# Patient Record
Sex: MALE | Race: WHITE | NOT HISPANIC OR LATINO | Employment: OTHER | URBAN - METROPOLITAN AREA
[De-identification: names, ages, dates, MRNs, and addresses within clinical notes are randomized per-mention and may not be internally consistent; named-entity substitution may affect disease eponyms.]

---

## 2018-12-31 ENCOUNTER — APPOINTMENT (OUTPATIENT)
Dept: PREADMISSION TESTING | Facility: HOSPITAL | Age: 68
End: 2018-12-31

## 2018-12-31 VITALS
RESPIRATION RATE: 20 BRPM | WEIGHT: 217 LBS | OXYGEN SATURATION: 96 % | HEIGHT: 73 IN | BODY MASS INDEX: 28.76 KG/M2 | TEMPERATURE: 98.3 F | SYSTOLIC BLOOD PRESSURE: 126 MMHG | DIASTOLIC BLOOD PRESSURE: 80 MMHG | HEART RATE: 56 BPM

## 2018-12-31 RX ORDER — ALLOPURINOL 300 MG/1
300 TABLET ORAL EVERY MORNING
COMMUNITY

## 2018-12-31 RX ORDER — CHLORHEXIDINE GLUCONATE 500 MG/1
1 CLOTH TOPICAL TAKE AS DIRECTED
COMMUNITY
End: 2019-01-03 | Stop reason: HOSPADM

## 2018-12-31 RX ORDER — AMLODIPINE BESYLATE 5 MG/1
5 TABLET ORAL EVERY MORNING
COMMUNITY

## 2018-12-31 RX ORDER — METOPROLOL SUCCINATE 100 MG/1
50 TABLET, EXTENDED RELEASE ORAL NIGHTLY
COMMUNITY

## 2018-12-31 RX ORDER — MELOXICAM 15 MG/1
15 TABLET ORAL DAILY
Status: ON HOLD | COMMUNITY
End: 2019-01-07

## 2018-12-31 RX ORDER — LISINOPRIL 20 MG/1
20 TABLET ORAL EVERY MORNING
COMMUNITY

## 2018-12-31 RX ORDER — ACETAMINOPHEN 500 MG
1000 TABLET ORAL EVERY 6 HOURS PRN
COMMUNITY

## 2019-01-02 ENCOUNTER — HOSPITAL ENCOUNTER (INPATIENT)
Facility: HOSPITAL | Age: 69
LOS: 1 days | Discharge: HOME | End: 2019-01-03
Attending: ORTHOPAEDIC SURGERY | Admitting: ORTHOPAEDIC SURGERY

## 2019-01-02 ENCOUNTER — ANESTHESIA (OUTPATIENT)
Dept: PERIOP | Facility: HOSPITAL | Age: 69
End: 2019-01-02

## 2019-01-02 ENCOUNTER — ANESTHESIA EVENT (OUTPATIENT)
Dept: PERIOP | Facility: HOSPITAL | Age: 69
End: 2019-01-02

## 2019-01-02 ENCOUNTER — APPOINTMENT (OUTPATIENT)
Dept: GENERAL RADIOLOGY | Facility: HOSPITAL | Age: 69
End: 2019-01-02

## 2019-01-02 PROBLEM — M48.02 CERVICAL SPINAL STENOSIS: Status: ACTIVE | Noted: 2019-01-02

## 2019-01-02 LAB
ABO GROUP BLD: NORMAL
ANION GAP SERPL CALCULATED.3IONS-SCNC: 15.4 MMOL/L
BASOPHILS # BLD AUTO: 0.01 10*3/MM3 (ref 0–0.2)
BASOPHILS NFR BLD AUTO: 0.1 % (ref 0–1.5)
BLD GP AB SCN SERPL QL: NEGATIVE
BUN BLD-MCNC: 16 MG/DL (ref 8–23)
BUN/CREAT SERPL: 12.5 (ref 7–25)
CALCIUM SPEC-SCNC: 9.1 MG/DL (ref 8.6–10.5)
CHLORIDE SERPL-SCNC: 100 MMOL/L (ref 98–107)
CO2 SERPL-SCNC: 23.6 MMOL/L (ref 22–29)
CREAT BLD-MCNC: 1.28 MG/DL (ref 0.76–1.27)
DEPRECATED RDW RBC AUTO: 43.7 FL (ref 37–54)
EOSINOPHIL # BLD AUTO: 0 10*3/MM3 (ref 0–0.7)
EOSINOPHIL NFR BLD AUTO: 0 % (ref 0.3–6.2)
ERYTHROCYTE [DISTWIDTH] IN BLOOD BY AUTOMATED COUNT: 13.2 % (ref 11.5–14.5)
GFR SERPL CREATININE-BSD FRML MDRD: 56 ML/MIN/1.73
GLUCOSE BLD-MCNC: 195 MG/DL (ref 65–99)
HCT VFR BLD AUTO: 45.4 % (ref 40.4–52.2)
HGB BLD-MCNC: 15.4 G/DL (ref 13.7–17.6)
IMM GRANULOCYTES # BLD AUTO: 0.03 10*3/MM3 (ref 0–0.03)
IMM GRANULOCYTES NFR BLD AUTO: 0.2 % (ref 0–0.5)
LYMPHOCYTES # BLD AUTO: 0.78 10*3/MM3 (ref 0.9–4.8)
LYMPHOCYTES NFR BLD AUTO: 5.6 % (ref 19.6–45.3)
MCH RBC QN AUTO: 30.9 PG (ref 27–32.7)
MCHC RBC AUTO-ENTMCNC: 33.9 G/DL (ref 32.6–36.4)
MCV RBC AUTO: 91.2 FL (ref 79.8–96.2)
MONOCYTES # BLD AUTO: 0.13 10*3/MM3 (ref 0.2–1.2)
MONOCYTES NFR BLD AUTO: 0.9 % (ref 5–12)
NEUTROPHILS # BLD AUTO: 13.03 10*3/MM3 (ref 1.9–8.1)
NEUTROPHILS NFR BLD AUTO: 93.4 % (ref 42.7–76)
PLATELET # BLD AUTO: 190 10*3/MM3 (ref 140–500)
PMV BLD AUTO: 10.9 FL (ref 6–12)
POTASSIUM BLD-SCNC: 4.2 MMOL/L (ref 3.5–5.2)
RBC # BLD AUTO: 4.98 10*6/MM3 (ref 4.6–6)
RH BLD: POSITIVE
SODIUM BLD-SCNC: 139 MMOL/L (ref 136–145)
T&S EXPIRATION DATE: NORMAL
WBC NRBC COR # BLD: 13.95 10*3/MM3 (ref 4.5–10.7)

## 2019-01-02 PROCEDURE — C1713 ANCHOR/SCREW BN/BN,TIS/BN: HCPCS | Performed by: ORTHOPAEDIC SURGERY

## 2019-01-02 PROCEDURE — 25010000002 ONDANSETRON PER 1 MG: Performed by: NURSE ANESTHETIST, CERTIFIED REGISTERED

## 2019-01-02 PROCEDURE — 85025 COMPLETE CBC W/AUTO DIFF WBC: CPT | Performed by: HOSPITALIST

## 2019-01-02 PROCEDURE — 76000 FLUOROSCOPY <1 HR PHYS/QHP: CPT

## 2019-01-02 PROCEDURE — L0120 CERV FLEX N/ADJ FOAM PRE OTS: HCPCS | Performed by: ORTHOPAEDIC SURGERY

## 2019-01-02 PROCEDURE — 25010000003 CEFAZOLIN IN DEXTROSE 2-4 GM/100ML-% SOLUTION: Performed by: ORTHOPAEDIC SURGERY

## 2019-01-02 PROCEDURE — 0RB30ZZ EXCISION OF CERVICAL VERTEBRAL DISC, OPEN APPROACH: ICD-10-PCS | Performed by: ORTHOPAEDIC SURGERY

## 2019-01-02 PROCEDURE — 25010000002 FENTANYL CITRATE (PF) 100 MCG/2ML SOLUTION: Performed by: ANESTHESIOLOGY

## 2019-01-02 PROCEDURE — 25010000002 DEXAMETHASONE PER 1 MG: Performed by: NURSE ANESTHETIST, CERTIFIED REGISTERED

## 2019-01-02 PROCEDURE — 25010000002 MIDAZOLAM PER 1 MG: Performed by: ANESTHESIOLOGY

## 2019-01-02 PROCEDURE — 86901 BLOOD TYPING SEROLOGIC RH(D): CPT | Performed by: ORTHOPAEDIC SURGERY

## 2019-01-02 PROCEDURE — 72040 X-RAY EXAM NECK SPINE 2-3 VW: CPT

## 2019-01-02 PROCEDURE — 0RB50ZZ EXCISION OF CERVICOTHORACIC VERTEBRAL DISC, OPEN APPROACH: ICD-10-PCS | Performed by: ORTHOPAEDIC SURGERY

## 2019-01-02 PROCEDURE — 86850 RBC ANTIBODY SCREEN: CPT | Performed by: ORTHOPAEDIC SURGERY

## 2019-01-02 PROCEDURE — 25010000002 FENTANYL CITRATE (PF) 100 MCG/2ML SOLUTION: Performed by: NURSE ANESTHETIST, CERTIFIED REGISTERED

## 2019-01-02 PROCEDURE — 25010000002 HYDROMORPHONE PER 4 MG: Performed by: NURSE ANESTHETIST, CERTIFIED REGISTERED

## 2019-01-02 PROCEDURE — 25010000003 DEXAMETHASONE SODIUM PHOSPHATE 100 MG/10ML SOLUTION 10 ML VIAL: Performed by: ORTHOPAEDIC SURGERY

## 2019-01-02 PROCEDURE — 86900 BLOOD TYPING SEROLOGIC ABO: CPT | Performed by: ORTHOPAEDIC SURGERY

## 2019-01-02 PROCEDURE — 0RG20A0 FUSION OF 2 OR MORE CERVICAL VERTEBRAL JOINTS WITH INTERBODY FUSION DEVICE, ANTERIOR APPROACH, ANTERIOR COLUMN, OPEN APPROACH: ICD-10-PCS | Performed by: ORTHOPAEDIC SURGERY

## 2019-01-02 PROCEDURE — 93010 ELECTROCARDIOGRAM REPORT: CPT | Performed by: INTERNAL MEDICINE

## 2019-01-02 PROCEDURE — 93005 ELECTROCARDIOGRAM TRACING: CPT | Performed by: HOSPITALIST

## 2019-01-02 PROCEDURE — 01N10ZZ RELEASE CERVICAL NERVE, OPEN APPROACH: ICD-10-PCS | Performed by: ORTHOPAEDIC SURGERY

## 2019-01-02 PROCEDURE — 80048 BASIC METABOLIC PNL TOTAL CA: CPT | Performed by: HOSPITALIST

## 2019-01-02 PROCEDURE — 25010000002 PROPOFOL 10 MG/ML EMULSION: Performed by: NURSE ANESTHETIST, CERTIFIED REGISTERED

## 2019-01-02 DEVICE — ANTERIOR CERVICAL PLATE ASSEMBLY, 4-LEVEL, 072MM
Type: IMPLANTABLE DEVICE | Site: SPINE CERVICAL | Status: FUNCTIONAL
Brand: TRESTLE LUXE

## 2019-01-02 DEVICE — KT HEMOST ABS SURGIFOAM PWDR 1GRAM: Type: IMPLANTABLE DEVICE | Site: SPINE CERVICAL | Status: FUNCTIONAL

## 2019-01-02 DEVICE — 4.0MM VARIABLE ANGLE, SELF-DRILLING HEXALOBE SCREW, 14MM
Type: IMPLANTABLE DEVICE | Site: SPINE CERVICAL | Status: FUNCTIONAL
Brand: TRESTLE LUXE

## 2019-01-02 DEVICE — WAX BONE HEMO NAT 2.5G: Type: IMPLANTABLE DEVICE | Site: SPINE CERVICAL | Status: FUNCTIONAL

## 2019-01-02 DEVICE — PUTTY DBM PROGENIX PLS 2.5CC: Type: IMPLANTABLE DEVICE | Site: SPINE CERVICAL | Status: FUNCTIONAL

## 2019-01-02 DEVICE — NOVEL CERVICAL SPACER, 7 MM LARGE, 7DEG LORDOTIC, PEEK
Type: IMPLANTABLE DEVICE | Site: SPINE CERVICAL | Status: FUNCTIONAL
Brand: NOVEL SPINAL SPACER SYSTEM

## 2019-01-02 RX ORDER — PROMETHAZINE HYDROCHLORIDE 25 MG/1
25 SUPPOSITORY RECTAL ONCE AS NEEDED
Status: DISCONTINUED | OUTPATIENT
Start: 2019-01-02 | End: 2019-01-02 | Stop reason: HOSPADM

## 2019-01-02 RX ORDER — DIPHENHYDRAMINE HCL 25 MG
25 CAPSULE ORAL
Status: DISCONTINUED | OUTPATIENT
Start: 2019-01-02 | End: 2019-01-02 | Stop reason: HOSPADM

## 2019-01-02 RX ORDER — PROMETHAZINE HYDROCHLORIDE 25 MG/ML
12.5 INJECTION, SOLUTION INTRAMUSCULAR; INTRAVENOUS ONCE AS NEEDED
Status: DISCONTINUED | OUTPATIENT
Start: 2019-01-02 | End: 2019-01-02 | Stop reason: HOSPADM

## 2019-01-02 RX ORDER — GABAPENTIN 300 MG/1
600 CAPSULE ORAL ONCE
Status: COMPLETED | OUTPATIENT
Start: 2019-01-02 | End: 2019-01-02

## 2019-01-02 RX ORDER — MIDAZOLAM HYDROCHLORIDE 1 MG/ML
2 INJECTION INTRAMUSCULAR; INTRAVENOUS
Status: DISCONTINUED | OUTPATIENT
Start: 2019-01-02 | End: 2019-01-02 | Stop reason: HOSPADM

## 2019-01-02 RX ORDER — ONDANSETRON 4 MG/1
4 TABLET, FILM COATED ORAL EVERY 6 HOURS PRN
Status: DISCONTINUED | OUTPATIENT
Start: 2019-01-02 | End: 2019-01-03 | Stop reason: HOSPADM

## 2019-01-02 RX ORDER — SODIUM CHLORIDE 0.9 % (FLUSH) 0.9 %
3-10 SYRINGE (ML) INJECTION AS NEEDED
Status: DISCONTINUED | OUTPATIENT
Start: 2019-01-02 | End: 2019-01-02 | Stop reason: HOSPADM

## 2019-01-02 RX ORDER — SENNA AND DOCUSATE SODIUM 50; 8.6 MG/1; MG/1
1 TABLET, FILM COATED ORAL NIGHTLY PRN
Status: DISCONTINUED | OUTPATIENT
Start: 2019-01-02 | End: 2019-01-03 | Stop reason: HOSPADM

## 2019-01-02 RX ORDER — ROCURONIUM BROMIDE 10 MG/ML
INJECTION, SOLUTION INTRAVENOUS AS NEEDED
Status: DISCONTINUED | OUTPATIENT
Start: 2019-01-02 | End: 2019-01-02 | Stop reason: SURG

## 2019-01-02 RX ORDER — HYDROMORPHONE HCL 110MG/55ML
PATIENT CONTROLLED ANALGESIA SYRINGE INTRAVENOUS AS NEEDED
Status: DISCONTINUED | OUTPATIENT
Start: 2019-01-02 | End: 2019-01-02 | Stop reason: SURG

## 2019-01-02 RX ORDER — EPHEDRINE SULFATE 50 MG/ML
INJECTION, SOLUTION INTRAVENOUS AS NEEDED
Status: DISCONTINUED | OUTPATIENT
Start: 2019-01-02 | End: 2019-01-02 | Stop reason: SURG

## 2019-01-02 RX ORDER — PROMETHAZINE HYDROCHLORIDE 25 MG/ML
12.5 INJECTION, SOLUTION INTRAMUSCULAR; INTRAVENOUS EVERY 6 HOURS PRN
Status: DISCONTINUED | OUTPATIENT
Start: 2019-01-02 | End: 2019-01-03 | Stop reason: HOSPADM

## 2019-01-02 RX ORDER — EPHEDRINE SULFATE 50 MG/ML
5 INJECTION, SOLUTION INTRAVENOUS ONCE AS NEEDED
Status: DISCONTINUED | OUTPATIENT
Start: 2019-01-02 | End: 2019-01-02 | Stop reason: HOSPADM

## 2019-01-02 RX ORDER — HYDROCODONE BITARTRATE AND ACETAMINOPHEN 5; 325 MG/1; MG/1
2 TABLET ORAL EVERY 4 HOURS PRN
Status: DISCONTINUED | OUTPATIENT
Start: 2019-01-02 | End: 2019-01-03 | Stop reason: HOSPADM

## 2019-01-02 RX ORDER — ACETAMINOPHEN 325 MG/1
650 TABLET ORAL ONCE AS NEEDED
Status: DISCONTINUED | OUTPATIENT
Start: 2019-01-02 | End: 2019-01-02 | Stop reason: HOSPADM

## 2019-01-02 RX ORDER — DEXAMETHASONE SODIUM PHOSPHATE 10 MG/ML
INJECTION INTRAMUSCULAR; INTRAVENOUS AS NEEDED
Status: DISCONTINUED | OUTPATIENT
Start: 2019-01-02 | End: 2019-01-02 | Stop reason: SURG

## 2019-01-02 RX ORDER — CYCLOBENZAPRINE HCL 10 MG
10 TABLET ORAL 3 TIMES DAILY PRN
Status: DISCONTINUED | OUTPATIENT
Start: 2019-01-02 | End: 2019-01-03 | Stop reason: HOSPADM

## 2019-01-02 RX ORDER — LABETALOL HYDROCHLORIDE 5 MG/ML
5 INJECTION, SOLUTION INTRAVENOUS
Status: DISCONTINUED | OUTPATIENT
Start: 2019-01-02 | End: 2019-01-02 | Stop reason: HOSPADM

## 2019-01-02 RX ORDER — LISINOPRIL 20 MG/1
20 TABLET ORAL EVERY MORNING
Status: DISCONTINUED | OUTPATIENT
Start: 2019-01-03 | End: 2019-01-03 | Stop reason: HOSPADM

## 2019-01-02 RX ORDER — SODIUM CHLORIDE, SODIUM LACTATE, POTASSIUM CHLORIDE, CALCIUM CHLORIDE 600; 310; 30; 20 MG/100ML; MG/100ML; MG/100ML; MG/100ML
9 INJECTION, SOLUTION INTRAVENOUS CONTINUOUS
Status: DISCONTINUED | OUTPATIENT
Start: 2019-01-02 | End: 2019-01-02 | Stop reason: HOSPADM

## 2019-01-02 RX ORDER — FLUMAZENIL 0.1 MG/ML
0.2 INJECTION INTRAVENOUS AS NEEDED
Status: DISCONTINUED | OUTPATIENT
Start: 2019-01-02 | End: 2019-01-02 | Stop reason: HOSPADM

## 2019-01-02 RX ORDER — SODIUM CHLORIDE 0.9 % (FLUSH) 0.9 %
1-10 SYRINGE (ML) INJECTION AS NEEDED
Status: DISCONTINUED | OUTPATIENT
Start: 2019-01-02 | End: 2019-01-02 | Stop reason: HOSPADM

## 2019-01-02 RX ORDER — PROMETHAZINE HYDROCHLORIDE 12.5 MG/1
12.5 TABLET ORAL EVERY 6 HOURS PRN
Status: DISCONTINUED | OUTPATIENT
Start: 2019-01-02 | End: 2019-01-03 | Stop reason: HOSPADM

## 2019-01-02 RX ORDER — SODIUM CHLORIDE 450 MG/100ML
100 INJECTION, SOLUTION INTRAVENOUS CONTINUOUS
Status: DISCONTINUED | OUTPATIENT
Start: 2019-01-02 | End: 2019-01-03 | Stop reason: HOSPADM

## 2019-01-02 RX ORDER — DOCUSATE SODIUM 100 MG/1
100 CAPSULE, LIQUID FILLED ORAL 2 TIMES DAILY PRN
Status: DISCONTINUED | OUTPATIENT
Start: 2019-01-02 | End: 2019-01-03 | Stop reason: HOSPADM

## 2019-01-02 RX ORDER — FENTANYL CITRATE 50 UG/ML
50 INJECTION, SOLUTION INTRAMUSCULAR; INTRAVENOUS
Status: DISCONTINUED | OUTPATIENT
Start: 2019-01-02 | End: 2019-01-02 | Stop reason: HOSPADM

## 2019-01-02 RX ORDER — BISACODYL 5 MG/1
5 TABLET, DELAYED RELEASE ORAL DAILY PRN
Status: DISCONTINUED | OUTPATIENT
Start: 2019-01-02 | End: 2019-01-03 | Stop reason: HOSPADM

## 2019-01-02 RX ORDER — HYDROCODONE BITARTRATE AND ACETAMINOPHEN 7.5; 325 MG/1; MG/1
1 TABLET ORAL ONCE AS NEEDED
Status: DISCONTINUED | OUTPATIENT
Start: 2019-01-02 | End: 2019-01-02 | Stop reason: HOSPADM

## 2019-01-02 RX ORDER — ONDANSETRON 2 MG/ML
INJECTION INTRAMUSCULAR; INTRAVENOUS AS NEEDED
Status: DISCONTINUED | OUTPATIENT
Start: 2019-01-02 | End: 2019-01-02 | Stop reason: SURG

## 2019-01-02 RX ORDER — SODIUM CHLORIDE 0.9 % (FLUSH) 0.9 %
3 SYRINGE (ML) INJECTION EVERY 12 HOURS SCHEDULED
Status: DISCONTINUED | OUTPATIENT
Start: 2019-01-02 | End: 2019-01-03 | Stop reason: HOSPADM

## 2019-01-02 RX ORDER — LIDOCAINE HYDROCHLORIDE 10 MG/ML
0.5 INJECTION, SOLUTION EPIDURAL; INFILTRATION; INTRACAUDAL; PERINEURAL ONCE AS NEEDED
Status: DISCONTINUED | OUTPATIENT
Start: 2019-01-02 | End: 2019-01-02 | Stop reason: HOSPADM

## 2019-01-02 RX ORDER — FENTANYL CITRATE 50 UG/ML
INJECTION, SOLUTION INTRAMUSCULAR; INTRAVENOUS AS NEEDED
Status: DISCONTINUED | OUTPATIENT
Start: 2019-01-02 | End: 2019-01-02 | Stop reason: SURG

## 2019-01-02 RX ORDER — ONDANSETRON 2 MG/ML
4 INJECTION INTRAMUSCULAR; INTRAVENOUS ONCE AS NEEDED
Status: COMPLETED | OUTPATIENT
Start: 2019-01-02 | End: 2019-01-02

## 2019-01-02 RX ORDER — SODIUM CHLORIDE 0.9 % (FLUSH) 0.9 %
3-10 SYRINGE (ML) INJECTION AS NEEDED
Status: DISCONTINUED | OUTPATIENT
Start: 2019-01-02 | End: 2019-01-03 | Stop reason: HOSPADM

## 2019-01-02 RX ORDER — HYDROMORPHONE HYDROCHLORIDE 1 MG/ML
0.5 INJECTION, SOLUTION INTRAMUSCULAR; INTRAVENOUS; SUBCUTANEOUS
Status: DISCONTINUED | OUTPATIENT
Start: 2019-01-02 | End: 2019-01-02 | Stop reason: HOSPADM

## 2019-01-02 RX ORDER — BISACODYL 10 MG
10 SUPPOSITORY, RECTAL RECTAL DAILY PRN
Status: DISCONTINUED | OUTPATIENT
Start: 2019-01-02 | End: 2019-01-03 | Stop reason: HOSPADM

## 2019-01-02 RX ORDER — SODIUM CHLORIDE 0.9 % (FLUSH) 0.9 %
3 SYRINGE (ML) INJECTION EVERY 12 HOURS SCHEDULED
Status: DISCONTINUED | OUTPATIENT
Start: 2019-01-02 | End: 2019-01-02 | Stop reason: HOSPADM

## 2019-01-02 RX ORDER — NALOXONE HCL 0.4 MG/ML
0.1 VIAL (ML) INJECTION
Status: DISCONTINUED | OUTPATIENT
Start: 2019-01-02 | End: 2019-01-03 | Stop reason: HOSPADM

## 2019-01-02 RX ORDER — NALOXONE HCL 0.4 MG/ML
0.2 VIAL (ML) INJECTION AS NEEDED
Status: DISCONTINUED | OUTPATIENT
Start: 2019-01-02 | End: 2019-01-02 | Stop reason: HOSPADM

## 2019-01-02 RX ORDER — HYDRALAZINE HYDROCHLORIDE 20 MG/ML
5 INJECTION INTRAMUSCULAR; INTRAVENOUS
Status: DISCONTINUED | OUTPATIENT
Start: 2019-01-02 | End: 2019-01-02 | Stop reason: HOSPADM

## 2019-01-02 RX ORDER — PROMETHAZINE HYDROCHLORIDE 25 MG/1
25 TABLET ORAL ONCE AS NEEDED
Status: DISCONTINUED | OUTPATIENT
Start: 2019-01-02 | End: 2019-01-02 | Stop reason: HOSPADM

## 2019-01-02 RX ORDER — METOPROLOL SUCCINATE 50 MG/1
50 TABLET, EXTENDED RELEASE ORAL NIGHTLY
Status: DISCONTINUED | OUTPATIENT
Start: 2019-01-02 | End: 2019-01-03 | Stop reason: HOSPADM

## 2019-01-02 RX ORDER — FAMOTIDINE 10 MG/ML
INJECTION, SOLUTION INTRAVENOUS
Status: COMPLETED
Start: 2019-01-02 | End: 2019-01-02

## 2019-01-02 RX ORDER — CEFAZOLIN SODIUM 2 G/100ML
2 INJECTION, SOLUTION INTRAVENOUS EVERY 8 HOURS
Status: DISCONTINUED | OUTPATIENT
Start: 2019-01-02 | End: 2019-01-03 | Stop reason: HOSPADM

## 2019-01-02 RX ORDER — PROMETHAZINE HYDROCHLORIDE 25 MG/1
12.5 SUPPOSITORY RECTAL EVERY 6 HOURS PRN
Status: DISCONTINUED | OUTPATIENT
Start: 2019-01-02 | End: 2019-01-03 | Stop reason: HOSPADM

## 2019-01-02 RX ORDER — OXYCODONE HCL 10 MG/1
10 TABLET, FILM COATED, EXTENDED RELEASE ORAL ONCE
Status: COMPLETED | OUTPATIENT
Start: 2019-01-02 | End: 2019-01-02

## 2019-01-02 RX ORDER — AMLODIPINE BESYLATE 5 MG/1
5 TABLET ORAL EVERY MORNING
Status: DISCONTINUED | OUTPATIENT
Start: 2019-01-03 | End: 2019-01-03 | Stop reason: HOSPADM

## 2019-01-02 RX ORDER — FAMOTIDINE 10 MG/ML
20 INJECTION, SOLUTION INTRAVENOUS ONCE
Status: COMPLETED | OUTPATIENT
Start: 2019-01-02 | End: 2019-01-02

## 2019-01-02 RX ORDER — PROPOFOL 10 MG/ML
VIAL (ML) INTRAVENOUS AS NEEDED
Status: DISCONTINUED | OUTPATIENT
Start: 2019-01-02 | End: 2019-01-02 | Stop reason: SURG

## 2019-01-02 RX ORDER — HYDROMORPHONE HCL IN 0.9% NACL 10 MG/50ML
PATIENT CONTROLLED ANALGESIA SYRINGE INTRAVENOUS CONTINUOUS
Status: DISCONTINUED | OUTPATIENT
Start: 2019-01-02 | End: 2019-01-03 | Stop reason: HOSPADM

## 2019-01-02 RX ORDER — MIDAZOLAM HYDROCHLORIDE 1 MG/ML
1 INJECTION INTRAMUSCULAR; INTRAVENOUS
Status: DISCONTINUED | OUTPATIENT
Start: 2019-01-02 | End: 2019-01-02 | Stop reason: HOSPADM

## 2019-01-02 RX ORDER — CEFAZOLIN SODIUM 2 G/100ML
2 INJECTION, SOLUTION INTRAVENOUS ONCE
Status: COMPLETED | OUTPATIENT
Start: 2019-01-02 | End: 2019-01-02

## 2019-01-02 RX ORDER — ONDANSETRON 4 MG/1
4 TABLET, ORALLY DISINTEGRATING ORAL EVERY 6 HOURS PRN
Status: DISCONTINUED | OUTPATIENT
Start: 2019-01-02 | End: 2019-01-03 | Stop reason: HOSPADM

## 2019-01-02 RX ORDER — ONDANSETRON 2 MG/ML
4 INJECTION INTRAMUSCULAR; INTRAVENOUS EVERY 6 HOURS PRN
Status: DISCONTINUED | OUTPATIENT
Start: 2019-01-02 | End: 2019-01-03 | Stop reason: HOSPADM

## 2019-01-02 RX ORDER — OXYCODONE AND ACETAMINOPHEN 7.5; 325 MG/1; MG/1
1 TABLET ORAL ONCE AS NEEDED
Status: DISCONTINUED | OUTPATIENT
Start: 2019-01-02 | End: 2019-01-02 | Stop reason: HOSPADM

## 2019-01-02 RX ORDER — ALLOPURINOL 300 MG/1
300 TABLET ORAL EVERY MORNING
Status: DISCONTINUED | OUTPATIENT
Start: 2019-01-03 | End: 2019-01-03 | Stop reason: HOSPADM

## 2019-01-02 RX ADMIN — FAMOTIDINE 20 MG: 10 INJECTION, SOLUTION INTRAVENOUS at 11:33

## 2019-01-02 RX ADMIN — METOPROLOL SUCCINATE 50 MG: 50 TABLET, EXTENDED RELEASE ORAL at 21:20

## 2019-01-02 RX ADMIN — CYCLOBENZAPRINE 10 MG: 10 TABLET, FILM COATED ORAL at 18:16

## 2019-01-02 RX ADMIN — HYDROMORPHONE HYDROCHLORIDE 0.5 MG: 2 INJECTION INTRAMUSCULAR; INTRAVENOUS; SUBCUTANEOUS at 15:39

## 2019-01-02 RX ADMIN — SODIUM CHLORIDE, POTASSIUM CHLORIDE, SODIUM LACTATE AND CALCIUM CHLORIDE: 600; 310; 30; 20 INJECTION, SOLUTION INTRAVENOUS at 14:35

## 2019-01-02 RX ADMIN — CEFAZOLIN SODIUM 2 G: 2 INJECTION, SOLUTION INTRAVENOUS at 13:03

## 2019-01-02 RX ADMIN — EPHEDRINE SULFATE 5 MG: 50 INJECTION INTRAMUSCULAR; INTRAVENOUS; SUBCUTANEOUS at 13:47

## 2019-01-02 RX ADMIN — HYDROMORPHONE HYDROCHLORIDE: 10 INJECTION, SOLUTION INTRAMUSCULAR; INTRAVENOUS; SUBCUTANEOUS at 16:32

## 2019-01-02 RX ADMIN — HYDROCODONE BITARTRATE AND ACETAMINOPHEN 2 TABLET: 5; 325 TABLET ORAL at 22:26

## 2019-01-02 RX ADMIN — SODIUM CHLORIDE 100 ML/HR: 4.5 INJECTION, SOLUTION INTRAVENOUS at 18:17

## 2019-01-02 RX ADMIN — DEXAMETHASONE SODIUM PHOSPHATE 10 MG: 10 INJECTION INTRAMUSCULAR; INTRAVENOUS at 13:34

## 2019-01-02 RX ADMIN — ONDANSETRON 4 MG: 2 INJECTION INTRAMUSCULAR; INTRAVENOUS at 15:13

## 2019-01-02 RX ADMIN — HYDROCODONE BITARTRATE AND ACETAMINOPHEN 2 TABLET: 5; 325 TABLET ORAL at 18:18

## 2019-01-02 RX ADMIN — FENTANYL CITRATE 50 MCG: 50 INJECTION INTRAMUSCULAR; INTRAVENOUS at 13:27

## 2019-01-02 RX ADMIN — GABAPENTIN 600 MG: 300 CAPSULE ORAL at 10:23

## 2019-01-02 RX ADMIN — HYDROMORPHONE HYDROCHLORIDE 0.5 MG: 2 INJECTION INTRAMUSCULAR; INTRAVENOUS; SUBCUTANEOUS at 15:42

## 2019-01-02 RX ADMIN — FENTANYL CITRATE 50 MCG: 50 INJECTION, SOLUTION INTRAMUSCULAR; INTRAVENOUS at 12:03

## 2019-01-02 RX ADMIN — OXYCODONE HYDROCHLORIDE 10 MG: 10 TABLET, FILM COATED, EXTENDED RELEASE ORAL at 10:23

## 2019-01-02 RX ADMIN — ROCURONIUM BROMIDE 50 MG: 10 INJECTION INTRAVENOUS at 12:59

## 2019-01-02 RX ADMIN — FENTANYL CITRATE 50 MCG: 50 INJECTION INTRAMUSCULAR; INTRAVENOUS at 13:23

## 2019-01-02 RX ADMIN — PROPOFOL 200 MG: 10 INJECTION, EMULSION INTRAVENOUS at 12:59

## 2019-01-02 RX ADMIN — EPHEDRINE SULFATE 5 MG: 50 INJECTION INTRAMUSCULAR; INTRAVENOUS; SUBCUTANEOUS at 14:22

## 2019-01-02 RX ADMIN — DEXAMETHASONE SODIUM PHOSPHATE 10 MG: 10 INJECTION, SOLUTION INTRAMUSCULAR; INTRAVENOUS at 10:32

## 2019-01-02 RX ADMIN — ONDANSETRON 4 MG: 2 INJECTION INTRAMUSCULAR; INTRAVENOUS at 16:54

## 2019-01-02 RX ADMIN — MIDAZOLAM HYDROCHLORIDE 1 MG: 2 INJECTION, SOLUTION INTRAMUSCULAR; INTRAVENOUS at 12:02

## 2019-01-02 RX ADMIN — CEFAZOLIN SODIUM 2 G: 2 INJECTION, SOLUTION INTRAVENOUS at 21:13

## 2019-01-02 RX ADMIN — SODIUM CHLORIDE, POTASSIUM CHLORIDE, SODIUM LACTATE AND CALCIUM CHLORIDE 9 ML/HR: 600; 310; 30; 20 INJECTION, SOLUTION INTRAVENOUS at 11:36

## 2019-01-02 RX ADMIN — FENTANYL CITRATE 100 MCG: 50 INJECTION INTRAMUSCULAR; INTRAVENOUS at 12:59

## 2019-01-02 NOTE — CONSULTS
CONSULT NOTE    INTERNAL MEDICINE   [facility]       Patient Identification:  Name: Kristian Burgos  Age: 68 y.o.  Sex: male  :  1950  MRN: [id number]             Date of Consultation:  2019      Primary Care Physician: Jaylen Dye MD                               Requesting Physician:   Bret Mathews DO.   Reason for Consultation:  Medical management.     Chief Complaint:  Post op.  No unusual c/o.     History of Present Illness:   Pleasant 68-year-old gentleman with history of cervical spine stenosis was now postop.  He has no unusual postop complaints.  Unfortunately there is very limited information about him in our system.  He does have a history of hypertension and paroxysmal atrial fibrillation the latter which he is taking Eliquis which of course is on hold now.  To his knowledge his hypertension is been under good control is been no issues with his paroxysmal atrial fibrillation.    Past Medical History:  Past Medical History:   Diagnosis Date   • Alteration in anticoagulation     on eloquis   • Arthritis    • Atrial fibrillation (CMS/HCC)    • Hypertension    • Migraines    • Neck pain      Past Surgical History:  Past Surgical History:   Procedure Laterality Date   • APPENDECTOMY     • CIRCUMCISION     • COLONOSCOPY     • ENDOSCOPY     • GUM SURGERY        Home Meds:  Medications Prior to Admission   Medication Sig Dispense Refill Last Dose   • allopurinol (ZYLOPRIM) 300 MG tablet Take 300 mg by mouth Every Morning.   2019 at 0900   • amLODIPine (NORVASC) 5 MG tablet Take 5 mg by mouth Every Morning.   2019 at 0830   • apixaban (ELIQUIS) 5 MG tablet tablet Take 5 mg by mouth 2 (Two) Times a Day.   2018   • aspirin 81 MG tablet Take 81 mg by mouth Daily.   2018   • Chlorhexidine Gluconate Cloth 2 % pads Apply 1 application topically Take As Directed. USE AS DIRECTED PREOP    2019 at 0800   • Cyanocobalamin (VITAMIN B-12 IJ) Inject 1,000 mcg as  "directed Every 30 (Thirty) Days.   12/3/2018   • lisinopril (PRINIVIL,ZESTRIL) 20 MG tablet Take 20 mg by mouth Every Morning.   2019 at 0900   • meloxicam (MOBIC) 15 MG tablet Take 15 mg by mouth Daily.   2018   • metoprolol succinate XL (TOPROL-XL) 100 MG 24 hr tablet Take 50 mg by mouth Every Night.   2019 at 2100   • acetaminophen (TYLENOL) 500 MG tablet Take 1,000 mg by mouth Every 6 (Six) Hours As Needed for Mild Pain .        Current Meds:   [unfilled]  Allergies:  Allergies   Allergen Reactions   • Crestor [Rosuvastatin] Other (See Comments)     rosi     Social History:   Social History     Tobacco Use   • Smoking status: Former Smoker     Packs/day: 1.00     Years: 10.00     Pack years: 10.00     Types: Cigarettes     Last attempt to quit:      Years since quittin.0   • Smokeless tobacco: Never Used   Substance Use Topics   • Alcohol use: Yes     Alcohol/week: 1.2 oz     Types: 2 Cans of beer per week      Family History:  Family History   Problem Relation Age of Onset   • Malig Hyperthermia Neg Hx           Review of Systems  Review of Systems   Constitutional: Negative.    HENT: Negative.    Eyes: Negative.    Respiratory: Negative.    Cardiovascular: Negative.    Gastrointestinal: Negative.    Endocrine: Negative.    Genitourinary: Negative.    Musculoskeletal: Negative.    Skin: Negative.    Allergic/Immunologic: Negative.    Neurological: Negative.    Hematological: Negative.    Psychiatric/Behavioral: Negative.          Vitals:   /78   Pulse 75   Temp 98 °F (36.7 °C) (Oral)   Resp 16   Ht 185.4 cm (73\")   Wt 97.1 kg (214 lb 1.6 oz)   SpO2 96%   BMI 28.25 kg/m²   I/O:     Intake/Output Summary (Last 24 hours) at 2019 1847  Last data filed at 2019 1714  Gross per 24 hour   Intake 1600 ml   Output --   Net 1600 ml       Exam:  Physical Exam   Constitutional: He is oriented to person, place, and time. He appears well-developed and well-nourished. No " distress.   HENT:   Head: Normocephalic and atraumatic.   Right Ear: External ear normal.   Left Ear: External ear normal.   Nose: Nose normal.   Mouth/Throat: Oropharynx is clear and moist.   Eyes: Conjunctivae and EOM are normal. Right eye exhibits no discharge. Left eye exhibits no discharge. No scleral icterus.   Neck:   Patient is wearing a soft collar and surgical drain is in place.   Cardiovascular: Normal rate, regular rhythm, normal heart sounds and intact distal pulses.   Pulmonary/Chest: Effort normal and breath sounds normal. No respiratory distress. He exhibits no tenderness.   Abdominal: Soft. Bowel sounds are normal. He exhibits no distension and no mass. There is no tenderness. There is no rebound and no guarding.   Musculoskeletal: He exhibits no edema.   Neurological: He is alert and oriented to person, place, and time.   Skin: Skin is warm and dry. He is not diaphoretic.   Psychiatric: He has a normal mood and affect. His behavior is normal. Thought content normal.       Data Review:  Labs in chart were reviewed.  Minimal information available to review.  Assessment:    Cervical spinal stenosis    Hypertension: Presently appears be under good control.  Continue home meds.  Paroxysmal atrial fibrillation: Presently in normal sinus rhythm.  Resume Eliquis when safe from the surgical point of view.    Plan:  Please see above.  I'm going to check some baseline labs and EKG and follow-up in a.m.  He certainly appreciate being involved in this pleasant gentle's care and will be happy to follow him along with you.    Jayden Leal MD   1/2/2019  6:47 PM    EMR Dragon/Transcription disclaimer:   Much of this encounter note is an electronic transcription/translation of spoken language to printed text. The electronic translation of spoken language may permit erroneous, or at times, nonsensical words or phrases to be inadvertently transcribed; Although I have reviewed the note for such errors, some may  still exist.

## 2019-01-02 NOTE — BRIEF OP NOTE
CERVICAL DISCECTOMY ANTERIOR FUSION WITH INSTRUMENTATION  Progress Note    Kristian Burgos  1/2/2019    Pre-op Diagnosis:   Cervical stenosis C3 to 7       Post-Op Diagnosis Codes:   Same    Procedure/CPT® Codes:      Procedure(s):  C3 - C7 ANTERIOR CERVICAL DISCECTOMY AND FUSION    Surgeon(s):  Bret Mathews DO    Anesthesia: General    Staff:   Circulator: Solange Jamil RN; Khanh Salas RN  Radiology Technologist: Mirta Robbins RRT  Scrub Person: Jordyn Zeng  Assistant: Jessica Linda PA    Estimated Blood Loss: 100ml    Urine Voided: * No values recorded between 1/2/2019 12:40 PM and 1/2/2019  3:28 PM *    Specimens:                None      Drains:   Closed/Suction Drain 1 Anterior Neck Bulb 10 Fr. (Active)       Findings: Severe stenosis    Complications: None apparent      Bret Mathews DO     Date: 1/2/2019  Time: 3:35 PM

## 2019-01-02 NOTE — ANESTHESIA POSTPROCEDURE EVALUATION
Patient: Kristian Burgos    Procedure Summary     Date:  01/02/19 Room / Location:  [facility] OR [address] [place] [address] MAIN OR    Anesthesia Start:  1249 Anesthesia Stop:  1550    Procedure:  C3 - C7 ANTERIOR CERVICAL DISCECTOMY AND FUSION (N/A Spine Cervical) Diagnosis:      Surgeon:  Bret Mathews DO Provider:  Zeinab Rivera MD    Anesthesia Type:  general ASA Status:  3          Anesthesia Type: general  Last vitals  BP   156/85 (01/02/19 1630)   Temp   36.8 °C (98.3 °F) (01/02/19 1544)   Pulse   80 (01/02/19 1630)   Resp   16 (01/02/19 1630)     SpO2   97 % (01/02/19 1630)     Post Anesthesia Care and Evaluation    Patient location during evaluation: bedside  Patient participation: complete - patient participated  Level of consciousness: awake  Pain score: 2  Pain management: adequate  Airway patency: patent  Anesthetic complications: No anesthetic complications    Cardiovascular status: acceptable  Respiratory status: acceptable  Hydration status: acceptable    Comments: --------------------            01/02/19 1630     --------------------   BP:       156/85     Pulse:      80       Resp:       16       Temp:                SpO2:      97%      --------------------

## 2019-01-02 NOTE — H&P
Patient Care Team:  Jaylen Dye MD as PCP - General (Family Medicine)  Truong Dubose MD (Cardiology)    Chief complaint neck pain    Subjective     Patient is a 68 y.o. male presents with neck pain. Onset of symptoms was 2018.  Symptoms are associated with headaches.     Review of Systems   Pertinent items are noted in HPI    History  Past Medical History:   Diagnosis Date   • Alteration in anticoagulation     on eloquis   • Arthritis    • Atrial fibrillation (CMS/HCC)    • Hypertension    • Migraines    • Neck pain      Past Surgical History:   Procedure Laterality Date   • APPENDECTOMY     • CIRCUMCISION     • COLONOSCOPY     • ENDOSCOPY     • GUM SURGERY       Family History   Problem Relation Age of Onset   • Malig Hyperthermia Neg Hx      Social History     Tobacco Use   • Smoking status: Former Smoker     Packs/day: 1.00     Years: 10.00     Pack years: 10.00     Types: Cigarettes     Last attempt to quit: 1978     Years since quittin.0   • Smokeless tobacco: Never Used   Substance Use Topics   • Alcohol use: Yes     Alcohol/week: 1.2 oz     Types: 2 Cans of beer per week   • Drug use: No     Medications Prior to Admission   Medication Sig Dispense Refill Last Dose   • allopurinol (ZYLOPRIM) 300 MG tablet Take 300 mg by mouth Every Morning.   2019 at 0900   • amLODIPine (NORVASC) 5 MG tablet Take 5 mg by mouth Every Morning.   2019 at 0830   • apixaban (ELIQUIS) 5 MG tablet tablet Take 5 mg by mouth 2 (Two) Times a Day.   2018   • aspirin 81 MG tablet Take 81 mg by mouth Daily.   2018   • Chlorhexidine Gluconate Cloth 2 % pads Apply 1 application topically Take As Directed. USE AS DIRECTED PREOP    2019 at 0800   • Cyanocobalamin (VITAMIN B-12 IJ) Inject 1,000 mcg as directed Every 30 (Thirty) Days.   12/3/2018   • lisinopril (PRINIVIL,ZESTRIL) 20 MG tablet Take 20 mg by mouth Every Morning.   2019 at 0900   • meloxicam (MOBIC) 15 MG tablet Take 15 mg by mouth  Daily.   12/26/2018   • metoprolol succinate XL (TOPROL-XL) 100 MG 24 hr tablet Take 50 mg by mouth Every Night.   1/1/2019 at 2100   • acetaminophen (TYLENOL) 500 MG tablet Take 1,000 mg by mouth Every 6 (Six) Hours As Needed for Mild Pain .        Allergies:  Crestor [rosuvastatin]    Objective     Vital Signs  Temp:  [97.9 °F (36.6 °C)] 97.9 °F (36.6 °C)  Heart Rate:  [54] 54  Resp:  [18-20] 20  BP: (156-194)/(87-93) 156/87    Physical Exam:    General Appearance alert, appears stated age and cooperative  Head normocephalic, without obvious abnormality and atraumatic  Throat oral mucosa moist  Neck supple and trachea midline  Lungs respirations regular, respirations even and respirations unlabored  Extremities moves extremities well, no edema, no cyanosis and no redness  Pulses Pulses palpable and equal bilaterally  Skin no bleeding, bruising or rash  Neurologic Mental Status orientated to person, place, time and situation, Sensory intact, Motor strength is equal in upper and lower extremities                                                            Results Review:    None    Assessment/Plan       Cervical spinal stenosis      ACDF C3-C7    I discussed the patients findings and my recommendations with patient and nursing staff.     Bret Mathews DO  01/02/19  11:56 AM

## 2019-01-02 NOTE — PLAN OF CARE
Problem: Patient Care Overview  Goal: Plan of Care Review  Outcome: Ongoing (interventions implemented as appropriate)   01/02/19 4891   Coping/Psychosocial   Plan of Care Reviewed With patient;spouse   Plan of Care Review   Progress improving   OTHER   Outcome Summary Pt admitted to 5P 593, S/P ACDF w/Fusion. Dialudid PCA instructions reviewed with wife and pt. Pt verbalizes understanding. Pain regimine and muscle relaxer times wrote on board and explaied. Pt DTV anytime. Pt has on a soft collar and is finding difficutly getting comfortable. Reviewed S/SX of distress with wife, and when to notify nursing immediately. Pt in NAD at this time. VSS      Goal: Individualization and Mutuality  Outcome: Ongoing (interventions implemented as appropriate)    Goal: Discharge Needs Assessment  Outcome: Ongoing (interventions implemented as appropriate)    Goal: Interprofessional Rounds/Family Conf  Outcome: Ongoing (interventions implemented as appropriate)      Problem: Skin Injury Risk (Adult)  Goal: Identify Related Risk Factors and Signs and Symptoms  Outcome: Ongoing (interventions implemented as appropriate)    Goal: Skin Health and Integrity  Outcome: Ongoing (interventions implemented as appropriate)      Problem: Pain, Acute (Adult)  Goal: Identify Related Risk Factors and Signs and Symptoms  Outcome: Ongoing (interventions implemented as appropriate)

## 2019-01-02 NOTE — OP NOTE
Pre-op Diagnosis:   Cervical stenosis C3 to 7  Post-Op Diagnosis Codes:  Same    Procedure:  1.  Anterior cervical arthrodesis including removal of the disc for decompression of the spinal cord and spinal nerve roots C3-C4 58074  2.  Anterior cervical arthrodesis including removal of the disc for decompression of spinal cord and spinal nerve roots C4-C5 73512  3.  Anterior cervical arthrodesis including removal of the disc for decompression of the spinal cord and spinal nerve roots C5-C6 32611  4.  Anterior cervical arthrodesis including removal of the disc for decompression of the spinal cord and spinal nerve roots C6-C7 41282  5.  Anterior cervical instrumentation C3, C4, C5, C6, and C7 21540  6.  Application of biomechanical intervertebral device C3-C4 74412  7.  Application of biomechanical intervertebral device C4-C5 66093  8.  Application of biomechanical intervertebral device C5-C6 28588  9.  Application of biomechanical intervertebral device C6-C7 57871  10.  Use of local autograft bone 07849    Surgeon:Bret Mathews DO    Assistant:Jessica Linda PA-C Please note I utilized the services of an assistant, specifically, Jessica Linda PA-C.  Jessica participated in crucial portions of the operation.  The use of Jessica greatly reduced overall operative time thereby reducing overall morbidity for the patient.      Anesthesia:General  Estimated Blood Loss: 100 mL  Specimens: * No orders in the log *  Complications: None apparent  Implant: Alphatec spine  Disposition: Patient recovery in stable condition    Preoperative indications: Patient's a 68 y.o.   who is been having severe neck pain and headaches from cervical stenosis.  After failure of conservative care I recommended ACDF surgery.  The risks of surgery were described to the patient including but not limited to; bleeding, infection, blood clots, pulmonary embolus, heart attack, stroke, risk of recurrent laryngeal nerve palsy causing voice disturbance, risk of  postoperative dysphasia either temporary or permanent, nerve damage or spinal cord injury causing complete or partial paralysis, risk of dural tear causing spinal headache, nonunion, hardware complication, need for further surgery, lack of guarantee, continued pain, continued weakness, and continued numbness.  The patient had many questions about these risks follow which are answered to the best of my ability in layman's terms.  Informed consent obtained.      Procedure in detail: The patient was identified in the preoperative holding area.  All labs and consent following order.  RICARDA hose and SCDs were applied for thromboembolic prophylaxis.  Her neck was marked with an indelible marker.  She was transferred to the operative suite where she was given the benefit of general anesthesia 2 g of Kefzol.  Ortiz catheter was placed as well as neuro monitoring leads.  She was positioned supine with  arms tucked to the side.  Shoulder roll was placed in her neck was prepped and draped in the usual sterile fashion.  Timeout was performed followed by a carotid incision to the anterior neck Bovie dissection as well as scissor dissection was performed to the platysma and anterior cervical fascia down the prevertebral fascia.  The trachea esophagus and carotid sheath were retracted while I incised the prevertebral fascia with the Bovie.  A marking device is placed and was confirmed to be in the C4 vertebra with fluoroscopy.  I then elevated the longus coli bilaterally from C3 to 7.  I removed the marking device and performed discectomy at C3-4.  I used curettes and pituitary to remove cartilaginous endplate as well as degenerative disc.  I used a lamina  to access the posterior annulus and posterior longitudinal ligament which was removed.  Kerrison punch was used to remove osteophytes from the uncovertebral joint.  Full decompression was performed of the spinal cord and the exiting C4 nerve roots.  I then irrigated and  achieved hemostasis.  I completed the same surgery at the remaining 3 levels.  After decompressing the bilateral C5, C6, and C7 roots I trialed and placed peek spacers at all 3 levels.  The spacers were filled with autograft bone as well as allograft bone.  72 mm Alphatec trestle lux plate was then affixed to the vertebra with 10 cortical screws.  Final x-rays are showing good placement of the hardware.  Final irrigation and hemostasis was obtained.  A TIKA drain was placed followed by layered closure of the wound.  Sterile dressings were applied.  Patient was awakened general anesthesia, transferred to the hospital bed, and taken to the recovery room in stable condition.    Disposition: The patient will be admitted for overnight stay.  He will be given pain medicine, antibiotic prophylaxis, and DVT prophylaxis.

## 2019-01-02 NOTE — ANESTHESIA PREPROCEDURE EVALUATION
Anesthesia Evaluation     Patient summary reviewed and Nursing notes reviewed   no history of anesthetic complications:  NPO Solid Status: > 6 hours  NPO Liquid Status: > 2 hours           Airway   Mallampati: III  TM distance: >3 FB  Neck ROM: full  No difficulty expected  Dental - normal exam   (+) implants    Pulmonary - normal exam   (+) a smoker Former,   Cardiovascular - normal exam    ECG reviewed    (+) hypertension, dysrhythmias (single episode 2010) Atrial Fib,       Neuro/Psych  GI/Hepatic/Renal/Endo      Musculoskeletal     (+) neck pain,   Abdominal    Substance History      OB/GYN          Other                      Anesthesia Plan    ASA 3     general   (Muriel preop)  Anesthetic plan, all risks, benefits, and alternatives have been provided, discussed and informed consent has been obtained with: patient.

## 2019-01-02 NOTE — ANESTHESIA PROCEDURE NOTES
ANESTHESIA INTUBATION  Urgency: elective    Airway not difficult    General Information and Staff    Patient location during procedure: OR  Anesthesiologist: Zeinab Rivera MD  CRNA: Gunjan Gastelum CRNA    Indications and Patient Condition  Indications for airway management: airway protection    Preoxygenated: yes  MILS not maintained throughout  Mask difficulty assessment: 1 - vent by mask    Final Airway Details  Final airway type: endotracheal airway      Successful airway: ETT and NIM tube  Cuffed: yes   Successful intubation technique: direct laryngoscopy  Endotracheal tube insertion site: oral  Blade: Antonietta  Blade size: 4  ETT size (mm): 8.0  Cormack-Lehane Classification: grade I - full view of glottis  Placement verified by: chest auscultation and capnometry   Measured from: lips  ETT to lips (cm): 23  Number of attempts at approach: 1    Additional Comments  Dentition intact and unchanged. CBEBS.  +ETCO2.

## 2019-01-02 NOTE — ANESTHESIA PROCEDURE NOTES
Arterial Line      Patient reassessed immediately prior to procedure    Patient location during procedure: holding area   Line placed for hemodynamic monitoring.  Performed By   Anesthesiologist: Elia Temple MD  Preanesthetic Checklist  Completed: patient identified, surgical consent, pre-op evaluation, timeout performed, IV checked, risks and benefits discussed and monitors and equipment checked  Arterial Line Prep   Sterile Tech: gloves  Prep: ChloraPrep  Patient monitoring: blood pressure monitoring, continuous pulse oximetry and EKG  Arterial Line Procedure   Laterality:right  Location:  radial artery  Catheter size: 20 G   Guidance: ultrasound guided  PROCEDURE NOTE/ULTRASOUND INTERPRETATION.  Using ultrasound guidance the potential vascular sites for insertion of the catheter were visualized to determine the patency of the vessel to be used for vascular access.  After selecting the appropriate site for insertion, the needle was visualized under ultrasound being inserted into the radial artery, followed by ultrasound confirmation of wire and catheter placement. There were no abnormalities seen on ultrasound; an image was taken; and the patient tolerated the procedure with no complications.   Number of attempts: 1  Successful placement: yes          Post Assessment   Dressing Type: occlusive dressing applied, secured with tape and wrist guard applied.   Circ/Move/Sens Assessment: normal.   Patient Tolerance: patient tolerated the procedure well with no apparent complications

## 2019-01-03 VITALS
HEIGHT: 73 IN | HEART RATE: 84 BPM | OXYGEN SATURATION: 96 % | TEMPERATURE: 98.2 F | BODY MASS INDEX: 28.37 KG/M2 | RESPIRATION RATE: 18 BRPM | SYSTOLIC BLOOD PRESSURE: 146 MMHG | DIASTOLIC BLOOD PRESSURE: 85 MMHG | WEIGHT: 214.1 LBS

## 2019-01-03 PROBLEM — M48.02 CERVICAL SPINAL STENOSIS: Status: RESOLVED | Noted: 2019-01-02 | Resolved: 2019-01-03

## 2019-01-03 LAB
ANION GAP SERPL CALCULATED.3IONS-SCNC: 11.6 MMOL/L
BUN BLD-MCNC: 16 MG/DL (ref 8–23)
BUN/CREAT SERPL: 13.3 (ref 7–25)
CALCIUM SPEC-SCNC: 8.8 MG/DL (ref 8.6–10.5)
CHLORIDE SERPL-SCNC: 100 MMOL/L (ref 98–107)
CO2 SERPL-SCNC: 26.4 MMOL/L (ref 22–29)
CREAT BLD-MCNC: 1.2 MG/DL (ref 0.76–1.27)
GFR SERPL CREATININE-BSD FRML MDRD: 60 ML/MIN/1.73
GLUCOSE BLD-MCNC: 165 MG/DL (ref 65–99)
HBA1C MFR BLD: 5.9 % (ref 4.8–5.6)
HCT VFR BLD AUTO: 44.5 % (ref 40.4–52.2)
HGB BLD-MCNC: 14.1 G/DL (ref 13.7–17.6)
POTASSIUM BLD-SCNC: 4.6 MMOL/L (ref 3.5–5.2)
SODIUM BLD-SCNC: 138 MMOL/L (ref 136–145)

## 2019-01-03 PROCEDURE — 85018 HEMOGLOBIN: CPT | Performed by: ORTHOPAEDIC SURGERY

## 2019-01-03 PROCEDURE — 85014 HEMATOCRIT: CPT | Performed by: ORTHOPAEDIC SURGERY

## 2019-01-03 PROCEDURE — 25010000003 CEFAZOLIN IN DEXTROSE 2-4 GM/100ML-% SOLUTION: Performed by: ORTHOPAEDIC SURGERY

## 2019-01-03 PROCEDURE — 80048 BASIC METABOLIC PNL TOTAL CA: CPT | Performed by: ORTHOPAEDIC SURGERY

## 2019-01-03 PROCEDURE — 83036 HEMOGLOBIN GLYCOSYLATED A1C: CPT | Performed by: INTERNAL MEDICINE

## 2019-01-03 RX ORDER — HYDROCODONE BITARTRATE AND ACETAMINOPHEN 5; 325 MG/1; MG/1
1 TABLET ORAL EVERY 6 HOURS PRN
Qty: 40 TABLET | Refills: 0 | Status: SHIPPED | OUTPATIENT
Start: 2019-01-03 | End: 2019-01-12

## 2019-01-03 RX ADMIN — ALLOPURINOL 300 MG: 300 TABLET ORAL at 06:50

## 2019-01-03 RX ADMIN — Medication 3 ML: at 08:19

## 2019-01-03 RX ADMIN — HYDROCODONE BITARTRATE AND ACETAMINOPHEN 2 TABLET: 5; 325 TABLET ORAL at 02:17

## 2019-01-03 RX ADMIN — AMLODIPINE BESYLATE 5 MG: 5 TABLET ORAL at 06:50

## 2019-01-03 RX ADMIN — SODIUM CHLORIDE 100 ML/HR: 4.5 INJECTION, SOLUTION INTRAVENOUS at 04:41

## 2019-01-03 RX ADMIN — HYDROCODONE BITARTRATE AND ACETAMINOPHEN 2 TABLET: 5; 325 TABLET ORAL at 06:50

## 2019-01-03 RX ADMIN — CYCLOBENZAPRINE 10 MG: 10 TABLET, FILM COATED ORAL at 02:17

## 2019-01-03 RX ADMIN — LISINOPRIL 20 MG: 20 TABLET ORAL at 06:50

## 2019-01-03 RX ADMIN — CEFAZOLIN SODIUM 2 G: 2 INJECTION, SOLUTION INTRAVENOUS at 04:41

## 2019-01-03 NOTE — PLAN OF CARE
Problem: Patient Care Overview  Goal: Plan of Care Review  Outcome: Ongoing (interventions implemented as appropriate)   01/03/19 0519   Coping/Psychosocial   Plan of Care Reviewed With patient   Plan of Care Review   Progress improving   OTHER   Outcome Summary S/p C3-7 ACDF. Incision C/D/I w/ TIKA drain still in place. PCA infusing. Utilizing around the clock dosing of pain medication for adequate pain control this shift. Voids spontaneously w/o any difficulties. SCDs on. Good hand  & dorsi/plantar flexion. Denies any numbness or tingling. VSS.       Problem: Pain, Acute (Adult)  Goal: Identify Related Risk Factors and Signs and Symptoms  Outcome: Ongoing (interventions implemented as appropriate)    Goal: Acceptable Pain Control/Comfort Level  Outcome: Ongoing (interventions implemented as appropriate)      Problem: Laminectomy/Foraminotomy/Discectomy (Adult)  Goal: Signs and Symptoms of Listed Potential Problems Will be Absent, Minimized or Managed (Laminectomy/Foraminotomy/Discectomy)  Outcome: Ongoing (interventions implemented as appropriate)

## 2019-01-03 NOTE — PROGRESS NOTES
Name: Kristian Burgos ADMIT: 2019   : 1950  PCP: Jaylen Dye MD    MRN: [id number] LOS: 1 days   AGE/SEX: 68 y.o. male  ROOM: Pending sale to [facility]   Subjective   Reason for follow-up: HTN  No acute events.  Overall patient feels much better.  No new complaints.  Pain is reasonably controlled.  No f/c/n/v/d.    Objective   Vital Signs  Temp:  [97.6 °F (36.4 °C)-98.3 °F (36.8 °C)] 98.2 °F (36.8 °C)  Heart Rate:  [54-95] 84  Resp:  [13-20] 18  BP: (136-194)/(67-98) 146/85  SpO2:  [92 %-98 %] 96 %  on  Flow (L/min):  [2-4] 2;   Device (Oxygen Therapy): nasal cannula  Body mass index is 28.25 kg/m².    Physical Exam   Constitutional: He is oriented to person, place, and time. No distress.   HENT:   Head: Normocephalic and atraumatic.   Mouth/Throat: Oropharynx is clear and moist.   Eyes: Conjunctivae and EOM are normal. Pupils are equal, round, and reactive to light.   Neck:   Soft collar and surgical drain in place   Cardiovascular: Normal rate, regular rhythm and intact distal pulses.   Pulmonary/Chest: Effort normal and breath sounds normal.   Abdominal: Soft. Bowel sounds are normal. There is no tenderness.   Musculoskeletal: He exhibits no edema or tenderness.   Neurological: He is alert and oriented to person, place, and time.   Skin: Skin is warm and dry. He is not diaphoretic.   Psychiatric: He has a normal mood and affect. His behavior is normal.   Nursing note and vitals reviewed.      Results Review:       I reviewed the patient's new clinical results.  Results from last 7 days   Lab Units  19   0548  19   WBC 10*3/mm3   --   13.95*   HEMOGLOBIN g/dL  14.1  15.4   PLATELETS 10*3/mm3   --   190     Results from last 7 days   Lab Units  1948  19   SODIUM mmol/L  138  139   POTASSIUM mmol/L  4.6  4.2   CHLORIDE mmol/L  100  100   CO2 mmol/L  26.4  23.6   BUN mg/dL  16  16   CREATININE mg/dL  1.20  1.28*   GLUCOSE mg/dL  165*  195*   Estimated Creatinine  Clearance: 72.3 mL/min (by C-G formula based on SCr of 1.2 mg/dL).    Results from last 7 days   Lab Units  01/03/19   0548  01/02/19   1940   CALCIUM mg/dL  8.8  9.1           allopurinol 300 mg Oral QAM   amLODIPine 5 mg Oral QAM   ceFAZolin 2 g Intravenous Q8H   lisinopril 20 mg Oral QAM   metoprolol succinate XL 50 mg Oral Nightly   sodium chloride 3 mL Intravenous Q12H       HYDROmorphone HCl-NaCl     sodium chloride 100 mL/hr Last Rate: 100 mL/hr (01/03/19 0441)   Diet Dysphagia; III - Pureed With Some Mashed      Assessment/Plan      Active Hospital Problems   No active problems to display.      Resolved Hospital Problems    Diagnosis Date Noted Date Resolved   • Cervical spinal stenosis [M48.02] 01/02/2019 01/03/2019   Cervical Spinal Stenosis  - management per primary team    HTN  - BP is acceptable  - continue on home medications    PAF  - currently in sinus rhythm  - normally on eliquis-can resume when cleared by neurosurgery    Hyperglycemia  - postoperative  - check A1C        Ananda Quiroga MD  [facility]  01/03/19  9:32 AM

## 2019-01-03 NOTE — NURSING NOTE
Delay in discharge due to waiting on wife, then during review of discharge med, the MD had resumed Mobic.  Nurse called office to verify what MD wanted to do with pt and his daily scheduled Mobic.  Dr. Mathews stated to hold x3 months.  Pt given explanation, noted on LUIS ARMANDO and wife was given same info.  Pt reviewed discharge and no further questions at this time. Denied needfor wc Ambulated to vehicle. NAD noted.

## 2019-01-07 ENCOUNTER — APPOINTMENT (OUTPATIENT)
Dept: GENERAL RADIOLOGY | Facility: HOSPITAL | Age: 69
End: 2019-01-07

## 2019-01-07 ENCOUNTER — HOSPITAL ENCOUNTER (INPATIENT)
Facility: HOSPITAL | Age: 69
LOS: 3 days | Discharge: HOME | End: 2019-01-10
Attending: INTERNAL MEDICINE | Admitting: HOSPITALIST

## 2019-01-07 ENCOUNTER — APPOINTMENT (OUTPATIENT)
Dept: CT IMAGING | Facility: HOSPITAL | Age: 69
End: 2019-01-07

## 2019-01-07 DIAGNOSIS — R06.00 DYSPNEA, UNSPECIFIED TYPE: ICD-10-CM

## 2019-01-07 DIAGNOSIS — G89.18 POST-OP PAIN: ICD-10-CM

## 2019-01-07 DIAGNOSIS — R55 NEAR SYNCOPE: Primary | ICD-10-CM

## 2019-01-07 PROBLEM — R73.03 PRE-DIABETES: Status: ACTIVE | Noted: 2019-01-07

## 2019-01-07 PROBLEM — R22.1 NECK SWELLING: Status: ACTIVE | Noted: 2019-01-07

## 2019-01-07 PROBLEM — I48.91 ATRIAL FIBRILLATION (HCC): Status: ACTIVE | Noted: 2019-01-07

## 2019-01-07 PROBLEM — I10 ESSENTIAL HYPERTENSION: Status: ACTIVE | Noted: 2019-01-07

## 2019-01-07 PROBLEM — E86.0 DEHYDRATION: Status: ACTIVE | Noted: 2019-01-07

## 2019-01-07 LAB
ALBUMIN SERPL-MCNC: 4.1 G/DL (ref 3.5–5.2)
ALBUMIN/GLOB SERPL: 1.1 G/DL
ALP SERPL-CCNC: 76 U/L (ref 39–117)
ALT SERPL W P-5'-P-CCNC: 38 U/L (ref 1–41)
ANION GAP SERPL CALCULATED.3IONS-SCNC: 11.8 MMOL/L
AST SERPL-CCNC: 25 U/L (ref 1–40)
BASOPHILS # BLD AUTO: 0.02 10*3/MM3 (ref 0–0.2)
BASOPHILS NFR BLD AUTO: 0.2 % (ref 0–1.5)
BILIRUB SERPL-MCNC: 1 MG/DL (ref 0.1–1.2)
BILIRUB UR QL STRIP: NEGATIVE
BUN BLD-MCNC: 22 MG/DL (ref 8–23)
BUN/CREAT SERPL: 15.5 (ref 7–25)
CALCIUM SPEC-SCNC: 10 MG/DL (ref 8.6–10.5)
CHLORIDE SERPL-SCNC: 103 MMOL/L (ref 98–107)
CLARITY UR: CLEAR
CO2 SERPL-SCNC: 27.2 MMOL/L (ref 22–29)
COLOR UR: ABNORMAL
CREAT BLD-MCNC: 1.42 MG/DL (ref 0.76–1.27)
DEPRECATED RDW RBC AUTO: 43.3 FL (ref 37–54)
EOSINOPHIL # BLD AUTO: 0.07 10*3/MM3 (ref 0–0.7)
EOSINOPHIL NFR BLD AUTO: 0.7 % (ref 0.3–6.2)
ERYTHROCYTE [DISTWIDTH] IN BLOOD BY AUTOMATED COUNT: 13.2 % (ref 11.5–14.5)
FLUAV AG NPH QL: NEGATIVE
FLUBV AG NPH QL IA: NEGATIVE
GFR SERPL CREATININE-BSD FRML MDRD: 50 ML/MIN/1.73
GLOBULIN UR ELPH-MCNC: 3.7 GM/DL
GLUCOSE BLD-MCNC: 131 MG/DL (ref 65–99)
GLUCOSE UR STRIP-MCNC: NEGATIVE MG/DL
HCT VFR BLD AUTO: 49.9 % (ref 40.4–52.2)
HGB BLD-MCNC: 17.3 G/DL (ref 13.7–17.6)
HGB UR QL STRIP.AUTO: NEGATIVE
IMM GRANULOCYTES # BLD AUTO: 0.05 10*3/MM3 (ref 0–0.03)
IMM GRANULOCYTES NFR BLD AUTO: 0.5 % (ref 0–0.5)
KETONES UR QL STRIP: ABNORMAL
LEUKOCYTE ESTERASE UR QL STRIP.AUTO: NEGATIVE
LYMPHOCYTES # BLD AUTO: 2.7 10*3/MM3 (ref 0.9–4.8)
LYMPHOCYTES NFR BLD AUTO: 26.6 % (ref 19.6–45.3)
MCH RBC QN AUTO: 31.3 PG (ref 27–32.7)
MCHC RBC AUTO-ENTMCNC: 34.7 G/DL (ref 32.6–36.4)
MCV RBC AUTO: 90.4 FL (ref 79.8–96.2)
MONOCYTES # BLD AUTO: 1.16 10*3/MM3 (ref 0.2–1.2)
MONOCYTES NFR BLD AUTO: 11.4 % (ref 5–12)
NEUTROPHILS # BLD AUTO: 6.19 10*3/MM3 (ref 1.9–8.1)
NEUTROPHILS NFR BLD AUTO: 61.1 % (ref 42.7–76)
NITRITE UR QL STRIP: NEGATIVE
NT-PROBNP SERPL-MCNC: 1014 PG/ML (ref 0–900)
PH UR STRIP.AUTO: 5.5 [PH] (ref 5–8)
PLATELET # BLD AUTO: 230 10*3/MM3 (ref 140–500)
PMV BLD AUTO: 11.2 FL (ref 6–12)
POTASSIUM BLD-SCNC: 4.4 MMOL/L (ref 3.5–5.2)
PROT SERPL-MCNC: 7.8 G/DL (ref 6–8.5)
PROT UR QL STRIP: NEGATIVE
RBC # BLD AUTO: 5.52 10*6/MM3 (ref 4.6–6)
SODIUM BLD-SCNC: 142 MMOL/L (ref 136–145)
SP GR UR STRIP: >=1.03 (ref 1–1.03)
TROPONIN T SERPL-MCNC: <0.01 NG/ML (ref 0–0.03)
UROBILINOGEN UR QL STRIP: ABNORMAL
WBC NRBC COR # BLD: 10.14 10*3/MM3 (ref 4.5–10.7)

## 2019-01-07 PROCEDURE — 83880 ASSAY OF NATRIURETIC PEPTIDE: CPT | Performed by: NURSE PRACTITIONER

## 2019-01-07 PROCEDURE — 81003 URINALYSIS AUTO W/O SCOPE: CPT | Performed by: EMERGENCY MEDICINE

## 2019-01-07 PROCEDURE — 25010000003 CEFAZOLIN 1-4 GM/50ML-% SOLUTION: Performed by: EMERGENCY MEDICINE

## 2019-01-07 PROCEDURE — 99285 EMERGENCY DEPT VISIT HI MDM: CPT

## 2019-01-07 PROCEDURE — 93005 ELECTROCARDIOGRAM TRACING: CPT

## 2019-01-07 PROCEDURE — 25010000002 IOPAMIDOL 61 % SOLUTION: Performed by: NURSE PRACTITIONER

## 2019-01-07 PROCEDURE — 93010 ELECTROCARDIOGRAM REPORT: CPT | Performed by: INTERNAL MEDICINE

## 2019-01-07 PROCEDURE — 25810000003 SODIUM CHLORIDE 0.9 % WITH KCL 20 MEQ 20-0.9 MEQ/L-% SOLUTION: Performed by: INTERNAL MEDICINE

## 2019-01-07 PROCEDURE — 80053 COMPREHEN METABOLIC PANEL: CPT | Performed by: NURSE PRACTITIONER

## 2019-01-07 PROCEDURE — 87804 INFLUENZA ASSAY W/OPTIC: CPT | Performed by: NURSE PRACTITIONER

## 2019-01-07 PROCEDURE — 71046 X-RAY EXAM CHEST 2 VIEWS: CPT

## 2019-01-07 PROCEDURE — 25010000003 CEFAZOLIN 1-4 GM/50ML-% SOLUTION: Performed by: INTERNAL MEDICINE

## 2019-01-07 PROCEDURE — 85025 COMPLETE CBC W/AUTO DIFF WBC: CPT | Performed by: NURSE PRACTITIONER

## 2019-01-07 PROCEDURE — 84484 ASSAY OF TROPONIN QUANT: CPT | Performed by: NURSE PRACTITIONER

## 2019-01-07 PROCEDURE — 70491 CT SOFT TISSUE NECK W/DYE: CPT

## 2019-01-07 PROCEDURE — 93005 ELECTROCARDIOGRAM TRACING: CPT | Performed by: NURSE PRACTITIONER

## 2019-01-07 PROCEDURE — 93005 ELECTROCARDIOGRAM TRACING: CPT | Performed by: INTERNAL MEDICINE

## 2019-01-07 RX ORDER — SODIUM CHLORIDE 0.9 % (FLUSH) 0.9 %
3-10 SYRINGE (ML) INJECTION AS NEEDED
Status: DISCONTINUED | OUTPATIENT
Start: 2019-01-07 | End: 2019-01-10 | Stop reason: HOSPADM

## 2019-01-07 RX ORDER — SODIUM CHLORIDE AND POTASSIUM CHLORIDE 150; 900 MG/100ML; MG/100ML
100 INJECTION, SOLUTION INTRAVENOUS CONTINUOUS
Status: DISCONTINUED | OUTPATIENT
Start: 2019-01-07 | End: 2019-01-08

## 2019-01-07 RX ORDER — ALLOPURINOL 300 MG/1
300 TABLET ORAL EVERY MORNING
Status: DISCONTINUED | OUTPATIENT
Start: 2019-01-08 | End: 2019-01-10 | Stop reason: HOSPADM

## 2019-01-07 RX ORDER — AMLODIPINE BESYLATE 5 MG/1
5 TABLET ORAL EVERY MORNING
Status: DISCONTINUED | OUTPATIENT
Start: 2019-01-08 | End: 2019-01-10 | Stop reason: HOSPADM

## 2019-01-07 RX ORDER — SODIUM CHLORIDE 0.9 % (FLUSH) 0.9 %
3 SYRINGE (ML) INJECTION EVERY 12 HOURS SCHEDULED
Status: DISCONTINUED | OUTPATIENT
Start: 2019-01-07 | End: 2019-01-10 | Stop reason: HOSPADM

## 2019-01-07 RX ORDER — SODIUM CHLORIDE 0.9 % (FLUSH) 0.9 %
10 SYRINGE (ML) INJECTION AS NEEDED
Status: DISCONTINUED | OUTPATIENT
Start: 2019-01-07 | End: 2019-01-10 | Stop reason: HOSPADM

## 2019-01-07 RX ORDER — ONDANSETRON 2 MG/ML
4 INJECTION INTRAMUSCULAR; INTRAVENOUS EVERY 6 HOURS PRN
Status: DISCONTINUED | OUTPATIENT
Start: 2019-01-07 | End: 2019-01-10 | Stop reason: HOSPADM

## 2019-01-07 RX ORDER — ONDANSETRON 4 MG/1
4 TABLET, ORALLY DISINTEGRATING ORAL EVERY 6 HOURS PRN
Status: DISCONTINUED | OUTPATIENT
Start: 2019-01-07 | End: 2019-01-10 | Stop reason: HOSPADM

## 2019-01-07 RX ORDER — NALOXONE HCL 0.4 MG/ML
0.4 VIAL (ML) INJECTION
Status: DISCONTINUED | OUTPATIENT
Start: 2019-01-07 | End: 2019-01-10 | Stop reason: HOSPADM

## 2019-01-07 RX ORDER — METOPROLOL SUCCINATE 50 MG/1
50 TABLET, EXTENDED RELEASE ORAL NIGHTLY
Status: DISCONTINUED | OUTPATIENT
Start: 2019-01-07 | End: 2019-01-10 | Stop reason: HOSPADM

## 2019-01-07 RX ORDER — CALCIUM CARBONATE 200(500)MG
2 TABLET,CHEWABLE ORAL 2 TIMES DAILY PRN
Status: DISCONTINUED | OUTPATIENT
Start: 2019-01-07 | End: 2019-01-10 | Stop reason: HOSPADM

## 2019-01-07 RX ORDER — NITROGLYCERIN 0.4 MG/1
0.4 TABLET SUBLINGUAL
Status: DISCONTINUED | OUTPATIENT
Start: 2019-01-07 | End: 2019-01-10 | Stop reason: HOSPADM

## 2019-01-07 RX ORDER — ASPIRIN 81 MG/1
81 TABLET ORAL DAILY
Status: DISCONTINUED | OUTPATIENT
Start: 2019-01-08 | End: 2019-01-10 | Stop reason: HOSPADM

## 2019-01-07 RX ORDER — BISACODYL 5 MG/1
5 TABLET, DELAYED RELEASE ORAL DAILY PRN
Status: DISCONTINUED | OUTPATIENT
Start: 2019-01-07 | End: 2019-01-10 | Stop reason: HOSPADM

## 2019-01-07 RX ORDER — ONDANSETRON 4 MG/1
4 TABLET, FILM COATED ORAL EVERY 6 HOURS PRN
Status: DISCONTINUED | OUTPATIENT
Start: 2019-01-07 | End: 2019-01-10 | Stop reason: HOSPADM

## 2019-01-07 RX ORDER — HYDROCODONE BITARTRATE AND ACETAMINOPHEN 5; 325 MG/1; MG/1
1 TABLET ORAL EVERY 6 HOURS PRN
Status: DISCONTINUED | OUTPATIENT
Start: 2019-01-07 | End: 2019-01-10 | Stop reason: HOSPADM

## 2019-01-07 RX ORDER — HYDROMORPHONE HYDROCHLORIDE 1 MG/ML
0.5 INJECTION, SOLUTION INTRAMUSCULAR; INTRAVENOUS; SUBCUTANEOUS
Status: DISCONTINUED | OUTPATIENT
Start: 2019-01-07 | End: 2019-01-10 | Stop reason: HOSPADM

## 2019-01-07 RX ORDER — CEFAZOLIN SODIUM 1 G/50ML
1 INJECTION, SOLUTION INTRAVENOUS EVERY 8 HOURS
Status: DISCONTINUED | OUTPATIENT
Start: 2019-01-07 | End: 2019-01-08

## 2019-01-07 RX ORDER — ACETAMINOPHEN 325 MG/1
650 TABLET ORAL EVERY 4 HOURS PRN
Status: DISCONTINUED | OUTPATIENT
Start: 2019-01-07 | End: 2019-01-10 | Stop reason: HOSPADM

## 2019-01-07 RX ORDER — CEFAZOLIN SODIUM 1 G/50ML
1 INJECTION, SOLUTION INTRAVENOUS ONCE
Status: COMPLETED | OUTPATIENT
Start: 2019-01-07 | End: 2019-01-07

## 2019-01-07 RX ORDER — FAMOTIDINE 10 MG/ML
20 INJECTION, SOLUTION INTRAVENOUS ONCE
Status: COMPLETED | OUTPATIENT
Start: 2019-01-07 | End: 2019-01-07

## 2019-01-07 RX ORDER — DOCUSATE SODIUM 100 MG/1
100 CAPSULE, LIQUID FILLED ORAL 2 TIMES DAILY
Status: DISCONTINUED | OUTPATIENT
Start: 2019-01-07 | End: 2019-01-10 | Stop reason: HOSPADM

## 2019-01-07 RX ORDER — HYDROCODONE BITARTRATE AND ACETAMINOPHEN 5; 325 MG/1; MG/1
1 TABLET ORAL EVERY 4 HOURS PRN
Status: DISCONTINUED | OUTPATIENT
Start: 2019-01-07 | End: 2019-01-07 | Stop reason: SDUPTHER

## 2019-01-07 RX ADMIN — SODIUM CHLORIDE 500 ML: 9 INJECTION, SOLUTION INTRAVENOUS at 11:59

## 2019-01-07 RX ADMIN — DOCUSATE SODIUM 100 MG: 100 CAPSULE, LIQUID FILLED ORAL at 21:39

## 2019-01-07 RX ADMIN — FAMOTIDINE 20 MG: 10 INJECTION, SOLUTION INTRAVENOUS at 12:00

## 2019-01-07 RX ADMIN — CEFAZOLIN SODIUM 1 G: 1 INJECTION, SOLUTION INTRAVENOUS at 21:39

## 2019-01-07 RX ADMIN — IOPAMIDOL 75 ML: 612 INJECTION, SOLUTION INTRAVENOUS at 11:53

## 2019-01-07 RX ADMIN — SODIUM CHLORIDE 500 ML: 9 INJECTION, SOLUTION INTRAVENOUS at 13:01

## 2019-01-07 RX ADMIN — SODIUM CHLORIDE, PRESERVATIVE FREE 3 ML: 5 INJECTION INTRAVENOUS at 21:40

## 2019-01-07 RX ADMIN — CEFAZOLIN SODIUM 1 G: 1 INJECTION, SOLUTION INTRAVENOUS at 16:35

## 2019-01-07 RX ADMIN — HYDROCODONE BITARTRATE AND ACETAMINOPHEN 1 TABLET: 5; 325 TABLET ORAL at 22:27

## 2019-01-07 RX ADMIN — POTASSIUM CHLORIDE AND SODIUM CHLORIDE 100 ML/HR: 900; 150 INJECTION, SOLUTION INTRAVENOUS at 21:39

## 2019-01-07 NOTE — ED PROVIDER NOTES
"The CESAR and I have discussed this patient's history, physical exam, and treatment plan. I have reviewed the documentation and personally had a face to face interaction with the patient  I affirm the documentation and agree with the treatment and plan.  The following describes my personal findings.    The patient, Hx of A-fib, presents complaining of intermittent anterior \"burning\" chest pain that has progressively worsened since his surgery 5 days ago, with last episode at 0400 today. Pt also c/o worsening light-headedness, diaphoresis, hot and cold flashes, upper sternal discomfort (worse with coughing and laying flat and better when sitting up), progressively sore throat since surgery with fullness of throat severe with lying flat causing feeling of SOA, intermittent SOA (worse with exertion and lying flat), and occasional productive cough with white/yellow sputum. Pt denies leg swelling,  Sx, fever,  abd pain, melena, or hematochezia.     Limited physical exam:  Patient is nontoxic appearing awake, alert, voice normal  Lungs/cardiovascular: lungs good air movement B, no rales, wheezing, nontachycardic, PT pulses intact  Neck: Surgical incision to right anterior neck that is well healing. No signs of infection or erythema.   Abdomen: nontender  Back/extremities: no BLE edema.  Skin: Multi-colored bruising in anterior throat area that tracks down to upper chest without significant objective swelling with vertical surgical incision R ant neck clean, dry, well healing      1414  Per Dr. Lamb, Radiology. CT Neck shows prominent STS prevertebral C4-C7. 1.9 cm somewhat more than would suspect post-operatively.     1430  Attempted to get in touch with Dr. Mathews, Orthopedic Surgery but he is currently in surgery and not available.     1500  Attempted to get in touch with Dr. Mathews but not is on call for him. Ortho on call.     1510  Discussed with Dr. Annie Hughes on call for Ortho. Attempting to get in touch with " Dr. Mathews. If I don't get in touch. will call her back again.     1550  Discussed the pt's case with Dr. Mathews, Orthopedic Surgery who said to cover pt with cefazolin, will see in consult     1600  Blood pressure = 119/81, O2 sats = 97% Pt recheck. Pt is resting in bed comfortably and states he feels better after 2L of IVF's systolic BPs now greater than 90s.  Pt reports no urgent need to urinate since in the ED. Notified pt of discussion with Dr. Mathews, Orthopedic Surgery. Discussed the plan to admit the pt. Pt understands and agrees with plan, all concerns addressed.     1619  Discussed the pt's case with Dr. Quiroga, [facility] who agrees to admit the pt.     Disposition:   Admission    Clinical Impressions:  Post-op pain  Dsypnea  Near syncope  Dehydration    ADMISSION    Discussed treatment plan and reason for admission with pt/family and admitting physician.  Pt/family voiced understanding of the plan for admission for further testing/treatment as needed.         4:27 PM  Latest vital signs   BP- 119/81 HR- 77 Temp- 97 °F (36.1 °C) (Tympanic) O2 sat- 96%      MDM  Pt had C3-C7 anterior cervical discectomy and fusion by Dr. Mathews on January 2nd 2018 and d/c the 3rd.          Documentation assistance provided by anthony Andino.  Information recorded by the anthony was done at my direction and has been verified and validated by me.                 Jj Andino  01/07/19 3060       Marina Aguilera MD  01/11/19 7578

## 2019-01-07 NOTE — ED NOTES
"Pt comes to ER for chest \"tightness and rawness\" x2 days. Pt had cervical fusion last Wednesday and states that since then he's been having SOA with exertion and laying down and states \"I feel like theres fluid in my lungs that I cant get up.\" Pt also states that he gets dizzy with standing up and nausea.       Maddi Collins, RN  01/07/19 1018    "

## 2019-01-07 NOTE — NURSING NOTE
Bedside swallow screen competed, passing criteria met.     Educated patient and wife about swallow precautions, and s/s of aspiration.     Pt. Does complain of pain with swallowing but says it is much improved from prior.     Initiated [facility] soft diet with thin liquids per MD order.

## 2019-01-07 NOTE — H&P
"    Patient Name:  Kristian Burgos  YOB: 1950  MRN:  [id number]  Admit Date:  2019  Patient Care Team:  Jaylen Dye MD as PCP - General (Family Medicine)  Truong Dubose MD (Cardiology)      Chief Complaint   Patient presents with   • Chest Pain     chest burning x 2 days post neck fusion last wednesday   • Shortness of Breath     while laying in bed last night - feels like there's liquid in lungs     Subjective   Mr. Burgos is a 68 y.o. former smoker with a history of HTN, PAF on Eliquis, and cervical spinal stenosis s/p extensive cervical spine decompression on 19 with Dr. Mathews  that presents to [facility] complaining of shortness of breath, dizziness on standing, and neck/upper chest \"tightness\" which has been present for the past 2 days. He states that his is much worse when he is in the supine position.  He denies fever, chills, and drainage from his surgical wounds.  He has had intermittent heavy sweating.    History of Present Illness    Past Medical History:   Diagnosis Date   • Alteration in anticoagulation     on eloquis   • Arthritis    • Atrial fibrillation (CMS/HCC)    • Hypertension    • Migraines    • Neck pain      Past Surgical History:   Procedure Laterality Date   • ANTERIOR CERVICAL DISCECTOMY W/ FUSION N/A 2019    Procedure: C3 - C7 ANTERIOR CERVICAL DISCECTOMY AND FUSION;  Surgeon: Bret Mathews DO;  Location: [facility];  Service: Orthopedic Spine   • APPENDECTOMY     • CIRCUMCISION     • COLONOSCOPY     • ENDOSCOPY     • GUM SURGERY       Family History   Problem Relation Age of Onset   • Malig Hyperthermia Neg Hx      Social History     Tobacco Use   • Smoking status: Former Smoker     Packs/day: 1.00     Years: 10.00     Pack years: 10.00     Types: Cigarettes     Last attempt to quit:      Years since quittin.0   • Smokeless tobacco: Never Used   Substance Use Topics   • Alcohol use: Yes     Alcohol/week: 1.2 oz     Types: 2 " Cans of beer per week   • Drug use: No     Medications Prior to Admission   Medication Sig Dispense Refill Last Dose   • acetaminophen (TYLENOL) 500 MG tablet Take 1,000 mg by mouth Every 6 (Six) Hours As Needed for Mild Pain .   1/6/2019 at Unknown time   • allopurinol (ZYLOPRIM) 300 MG tablet Take 300 mg by mouth Every Morning.   1/6/2019 at Unknown time   • amLODIPine (NORVASC) 5 MG tablet Take 5 mg by mouth Every Morning.   1/6/2019 at Unknown time   • apixaban (ELIQUIS) 5 MG tablet tablet Take 5 mg by mouth 2 (Two) Times a Day.   1/6/2019 at Unknown time   • aspirin 81 MG tablet Take 81 mg by mouth Daily.   1/6/2019 at Unknown time   • Cyanocobalamin (VITAMIN B-12 IJ) Inject 1,000 mcg as directed Every 30 (Thirty) Days.   Past Month at Unknown time   • HYDROcodone-acetaminophen (NORCO) 5-325 MG per tablet Take 1 tablet by mouth Every 6 (Six) Hours As Needed for Moderate Pain  for up to 9 days. 40 tablet 0 1/6/2019 at Unknown time   • lisinopril (PRINIVIL,ZESTRIL) 20 MG tablet Take 20 mg by mouth Every Morning.   1/6/2019 at Unknown time   • metoprolol succinate XL (TOPROL-XL) 100 MG 24 hr tablet Take 50 mg by mouth Every Night.   1/6/2019 at Unknown time   • meloxicam (MOBIC) 15 MG tablet Take 15 mg by mouth Daily.   12/26/2018     Allergies:    Allergies   Allergen Reactions   • Crestor [Rosuvastatin] Other (See Comments)     rosi       Review of Systems   Constitutional: Negative for appetite change, chills and fever.   HENT: Positive for sore throat. Negative for congestion, nosebleeds and trouble swallowing.    Eyes: Negative for redness and visual disturbance.   Respiratory: Positive for shortness of breath. Negative for cough and choking.    Cardiovascular: Negative for chest pain, palpitations and leg swelling.   Gastrointestinal: Negative for abdominal pain, constipation, diarrhea, nausea and vomiting.   Endocrine: Negative for cold intolerance and heat intolerance.   Genitourinary: Negative for  decreased urine volume, difficulty urinating, dysuria and hematuria.   Musculoskeletal: Negative for arthralgias and myalgias.   Skin: Negative for pallor and rash.   Neurological: Positive for weakness (generalized) and light-headedness. Negative for dizziness and headaches.   Hematological: Negative for adenopathy. Does not bruise/bleed easily.   Psychiatric/Behavioral: Negative for confusion and decreased concentration.        Objective    Vital Signs  Temp:  [97 °F (36.1 °C)-97.7 °F (36.5 °C)] 97.7 °F (36.5 °C)  Heart Rate:  [] 65  Resp:  [14-18] 18  BP: ()/(72-99) 140/87  SpO2:  [94 %-98 %] 96 %  on   ;   Device (Oxygen Therapy): room air  Body mass index is 28.22 kg/m².    Physical Exam   Constitutional: He is oriented to person, place, and time. No distress.   HENT:   Head: Normocephalic and atraumatic.   Mouth/Throat: Oropharynx is clear and moist.   Eyes: Conjunctivae and EOM are normal. Pupils are equal, round, and reactive to light.   Neck: No JVD present.   Surgical incision c/d/i without e/o erythema   Cardiovascular: Normal rate, regular rhythm and intact distal pulses.   Pulmonary/Chest: Effort normal and breath sounds normal.   Abdominal: Soft. Bowel sounds are normal.   Musculoskeletal: He exhibits no edema or tenderness.   Neurological: He is alert and oriented to person, place, and time.   Skin: Skin is warm and dry. He is not diaphoretic.   Psychiatric: He has a normal mood and affect. His behavior is normal.   Nursing note and vitals reviewed.      Results Review:  I reviewed the patient's new clinical results.  I reviewed the patient's new imaging results and agree with the interpretation.  I reviewed the patient's other test results and agree with the interpretation  I personally viewed and interpreted the patient's EKG/Telemetry data  Discussed with ED provider.      Lab Results (last 24 hours)     Procedure Component Value Units Date/Time    CBC & Differential [[id number]]  Collected:  01/07/19 1021    Specimen:  Blood Updated:  01/07/19 1103    Narrative:       The following orders were created for panel order CBC & Differential.  Procedure                               Abnormality         Status                     ---------                               -----------         ------                     CBC Auto Differential[[id number]]        Abnormal            Final result                 Please view results for these tests on the individual orders.    Comprehensive Metabolic Panel [[id number]]  (Abnormal) Collected:  01/07/19 1021    Specimen:  Blood Updated:  01/07/19 1107     Glucose 131 mg/dL      BUN 22 mg/dL      Creatinine 1.42 mg/dL      Sodium 142 mmol/L      Potassium 4.4 mmol/L      Chloride 103 mmol/L      CO2 27.2 mmol/L      Calcium 10.0 mg/dL      Total Protein 7.8 g/dL      Albumin 4.10 g/dL      ALT (SGPT) 38 U/L      AST (SGOT) 25 U/L      Alkaline Phosphatase 76 U/L      Total Bilirubin 1.0 mg/dL      eGFR Non African Amer 50 mL/min/1.73      Globulin 3.7 gm/dL      A/G Ratio 1.1 g/dL      BUN/Creatinine Ratio 15.5     Anion Gap 11.8 mmol/L     Troponin [[id number]]  (Normal) Collected:  01/07/19 1021    Specimen:  Blood Updated:  01/07/19 1107     Troponin T <0.010 ng/mL     Narrative:       Troponin T Reference Ranges:  Less than 0.03 ng/mL:    Negative for AMI  0.03 to 0.09 ng/mL:      Indeterminant for AMI  Greater than 0.09 ng/mL: Positive for AMI    BNP [[id number]]  (Abnormal) Collected:  01/07/19 1021    Specimen:  Blood Updated:  01/07/19 1105     proBNP 1,014.0 pg/mL     Narrative:       Among patients with dyspnea, NT-proBNP is highly sensitive for the detection of acute congestive heart failure. In addition NT-proBNP of <300 pg/ml effectively rules out acute congestive heart failure with 99% negative predictive value.    CBC Auto Differential [[id number]]  (Abnormal) Collected:  01/07/19 1021    Specimen:  Blood Updated:  01/07/19 1103     WBC 10.14 10*3/mm3       RBC 5.52 10*6/mm3      Hemoglobin 17.3 g/dL      Hematocrit 49.9 %      MCV 90.4 fL      MCH 31.3 pg      MCHC 34.7 g/dL      RDW 13.2 %      RDW-SD 43.3 fl      MPV 11.2 fL      Platelets 230 10*3/mm3      Neutrophil % 61.1 %      Lymphocyte % 26.6 %      Monocyte % 11.4 %      Eosinophil % 0.7 %      Basophil % 0.2 %      Immature Grans % 0.5 %      Neutrophils, Absolute 6.19 10*3/mm3      Lymphocytes, Absolute 2.70 10*3/mm3      Monocytes, Absolute 1.16 10*3/mm3      Eosinophils, Absolute 0.07 10*3/mm3      Basophils, Absolute 0.02 10*3/mm3      Immature Grans, Absolute 0.05 10*3/mm3     Influenza Antigen, Rapid - Swab, Nasopharynx [[id number]]  (Normal) Collected:  01/07/19 1122    Specimen:  Swab from Nasopharynx Updated:  01/07/19 1147     Influenza A Ag, EIA Negative     Influenza B Ag, EIA Negative    Urinalysis With Microscopic If Indicated (No Culture) - Urine, Clean Catch [[id number]]  (Abnormal) Collected:  01/07/19 1259    Specimen:  Urine, Clean Catch Updated:  01/07/19 1314     Color, UA Dark Yellow     Appearance, UA Clear     pH, UA 5.5     Specific Gravity, UA >=1.030     Glucose, UA Negative     Ketones, UA Trace     Bilirubin, UA Negative     Blood, UA Negative     Protein, UA Negative     Leuk Esterase, UA Negative     Nitrite, UA Negative     Urobilinogen, UA 1.0 E.U./dL    Narrative:       Urine microscopic not indicated.          Imaging Results (last 24 hours)     Procedure Component Value Units Date/Time    CT Soft Tissue Neck With Contrast [[id number]] Collected:  01/07/19 1353     Updated:  01/07/19 1455    Narrative:       CT SCAN OF THE SOFT TISSUES OF THE NECK WITH CONTRAST     CLINICAL HISTORY: Difficulty breathing and shortness of air. Heart  replacement.     CT scan of the soft tissues of the neck was obtained with 3 mm axial  images following the administration of IV contrast.     FINDINGS:     The patient is status post a recent anterior cervical discectomy and  fusion procedure  from C3 down to C7. An anterior cervical plate and  traversing screws fuse C3 down to C7 and there are interbody prostheses  identified at the level of the C3-4, C4-5, C5-6, and C6-7 disc spaces.  Anterior to the C5 through C7 vertebral bodies, there is prominence of  the prevertebral soft tissues. At the C7 level, the soft tissues measure  up to approximately 1.9 cm in greatest AP dimensions. The findings are  nonspecific. To the extent that these abnormal soft tissues could  represent postoperative changes with edema, postoperative hematoma, or  even postoperative infection cannot be accurately assessed on the basis  of this study. The findings could be further characterized with MR  imaging if clinically indicated. Also, mass effect upon the esophagus  could be further evaluated with a barium or Gastrografin swallow study.     The visualized contents of the cranial vault are unremarkable. The  parotid glands and submandibular glands are within normal limits. The  oral cavity and tongue are within normal limits. The nasopharynx,  hypopharynx, and oropharynx are unremarkable. The thyroid gland is  unremarkable in appearance. There is no pathologic lymphadenopathy  throughout the neck. The visualized lung apices are clear.     Incidental note is made of a left ICA stenosis at the origin. This is  estimated to be approximately 20% by NASCET criteria.        Impression:          The patient is status post a recent anterior cervical discectomy and  fusion procedure from C3 down to C7. An anterior cervical plate and  traversing screws fuse C3 down to C7. Additionally, there are interbody  prostheses in place at the level of the C3-4, C4-5, C5-6, and C6-7 disc  spaces. Anterior to the C5 through C7 vertebral bodies, there are  prominent prevertebral soft tissues measuring up to 1.9 cm in greatest  AP dimensions at the C7 level. To the extent that these findings are  representative of postoperative edema, postoperative  hematoma, or even  postoperative infection cannot be accurately assessed on the basis of  this study. The findings could be potentially further characterized with  MR imaging if clinically indicated. Also, mass effect upon the esophagus  could be further assessed with a barium or Gastrografin swallow exam.     Incidental note of 20% NASCET stenosis within the proximal aspect of the  left internal carotid artery.     These findings and recommendations are discussed with Jordyn Zeng on  01/07/2019 at approximately 12:45 PM.     Radiation dose reduction techniques were utilized, including automated  exposure control and exposure modulation based on body size.     This report was finalized on 1/7/2019 2:51 PM by Dr. Edvin Lamb M.D.       XR Chest 2 View [[id number]] Collected:  01/07/19 1147     Updated:  01/07/19 1300    Narrative:       TWO-VIEW CHEST     HISTORY: Shortness of breath.     FINDINGS:  The lungs are well-expanded and clear and the heart and hilar  structures are normal. There is no acute disease.     This report was finalized on 1/7/2019 12:56 PM by Dr. Manuel Medina M.D.             ECG 12 Lead         ECG 12 Lead    (Results Pending)     Assessment/Plan   Active Hospital Problems    Diagnosis Date Noted   • Post-operative pain [G89.18] 01/07/2019   • Neck swelling [R22.1] 01/07/2019   • Dehydration [E86.0] 01/07/2019   • Essential hypertension [I10] 01/07/2019   • Atrial fibrillation (CMS/HCC) [I48.91] 01/07/2019   • Pre-diabetes [R73.03] 01/07/2019      Resolved Hospital Problems   No resolved problems to display.   Neck Swelling/Postoperative Pain  - could just be edema but infection is possible  - no signs of airway compromise  - covering with IV cefazolin-no e/o abscess on CT   - Dr. Mathews consulted from ER  - had a baked potato earlier but will keep NPO for now as he is at risk for aspiration  - monitor on telemetry    Dehydration/Light-headedness  - had low nml BP which was responsive to  IVF  - continue IVF    PAF  - usually on AC with eliquis-holding currently for surgical reasons    DVT Prophylaxis  - SCDs    I discussed the patients findings and my recommendations with patient and nursing staff.      Ananda Quiroga MD  [facility]  01/07/19  10:55 PM

## 2019-01-07 NOTE — PLAN OF CARE
Problem: Patient Care Overview  Goal: Plan of Care Review  Outcome: Ongoing (interventions implemented as appropriate)   01/07/19 1815   Coping/Psychosocial   Plan of Care Reviewed With patient   Coping/Psychosocial   Patient Agreement with Plan of Care agrees   Plan of Care Review   Progress no change   OTHER   Outcome Summary Pt admitted to floor oriented to unit routines and medical equipment, safety discussed VSS     Goal: Individualization and Mutuality  Outcome: Ongoing (interventions implemented as appropriate)    Goal: Discharge Needs Assessment  Outcome: Ongoing (interventions implemented as appropriate)    Goal: Interprofessional Rounds/Family Conf  Outcome: Ongoing (interventions implemented as appropriate)

## 2019-01-07 NOTE — ED PROVIDER NOTES
" EMERGENCY DEPARTMENT ENCOUNTER    CHIEF COMPLAINT  Chief Complaint: Chest Pain  History given by: Patient  History limited by:   Room Number: N545/1  PMD: Jaylen Dye MD      HPI:  Pt is a 68 y.o. male who presents complaining of anterior chest \"burning\" that began today at 0200.Pt had previous C3-C7 fusion 5 days ago. Pt also reports a burning sensation of the throat and chest soreness since his surgery. Pt reports some GASPAR and states that his chest soreness is exacerbated with breathing. Pt reports a non-productive cough, but does have a sensation of mucus production. No hx of CHF. He has restarted his Eliquis for Afib.     Duration: 8 hours  Onset: sudden  Timing: constant  Location: anterior chest  Radiation: throat  Quality: burning, soreness  Intensity/Severity: moderate  Progression: unchanged  Associated Symptoms: sore throat, SOA, cough   Aggravating Factors: exertion, deep breathing  Alleviating Factors: none  Previous Episodes: none  Treatment before arrival: had C3-C7 fusion on 1/2/18    PAST MEDICAL HISTORY  Active Ambulatory Problems     Diagnosis Date Noted   • No Active Ambulatory Problems     Resolved Ambulatory Problems     Diagnosis Date Noted   • Cervical spinal stenosis 01/02/2019     Past Medical History:   Diagnosis Date   • Alteration in anticoagulation    • Arthritis    • Atrial fibrillation (CMS/HCC)    • Hypertension    • Migraines    • Neck pain        PAST SURGICAL HISTORY  Past Surgical History:   Procedure Laterality Date   • ANTERIOR CERVICAL DISCECTOMY W/ FUSION N/A 1/2/2019    Procedure: C3 - C7 ANTERIOR CERVICAL DISCECTOMY AND FUSION;  Surgeon: Bret Mathews DO;  Location: [facility];  Service: Orthopedic Spine   • APPENDECTOMY     • CIRCUMCISION     • COLONOSCOPY     • ENDOSCOPY     • GUM SURGERY         FAMILY HISTORY  Family History   Problem Relation Age of Onset   • Malig Hyperthermia Neg Hx        SOCIAL HISTORY  Social History     Socioeconomic History   • Marital " "status:      Spouse name: Not on file   • Number of children: Not on file   • Years of education: Not on file   • Highest education level: Not on file   Social Needs   • Financial resource strain: Not on file   • Food insecurity - worry: Not on file   • Food insecurity - inability: Not on file   • Transportation needs - medical: Not on file   • Transportation needs - non-medical: Not on file   Occupational History   • Not on file   Tobacco Use   • Smoking status: Former Smoker     Packs/day: 1.00     Years: 10.00     Pack years: 10.00     Types: Cigarettes     Last attempt to quit:      Years since quittin.0   • Smokeless tobacco: Never Used   Substance and Sexual Activity   • Alcohol use: Yes     Alcohol/week: 1.2 oz     Types: 2 Cans of beer per week   • Drug use: No   • Sexual activity: Defer   Other Topics Concern   • Not on file   Social History Narrative   • Not on file       ALLERGIES  Crestor [rosuvastatin]    REVIEW OF SYSTEMS  Review of Systems   Constitutional: Negative for activity change, appetite change and fever.   HENT: Positive for sore throat. Negative for congestion.    Eyes: Negative.    Respiratory: Positive for shortness of breath. Negative for cough.    Cardiovascular: Negative for chest pain and leg swelling.        Chest \"burning\" and soreness   Gastrointestinal: Negative for abdominal pain, diarrhea and vomiting.   Endocrine: Negative.    Genitourinary: Negative for decreased urine volume and dysuria.   Musculoskeletal: Negative for neck pain.   Skin: Negative for rash and wound.   Allergic/Immunologic: Negative.    Neurological: Negative for weakness, numbness and headaches.   Hematological: Negative.    Psychiatric/Behavioral: Negative.    All other systems reviewed and are negative.      PHYSICAL EXAM  ED Triage Vitals [19 0920]   Temp Heart Rate Resp BP SpO2   97 °F (36.1 °C) 104 18 -- 94 %      Temp src Heart Rate Source Patient Position BP Location FiO2 (%) "   Tympanic Monitor -- -- --       Physical Exam   Constitutional: He is oriented to person, place, and time. No distress.   HENT:   Head: Normocephalic and atraumatic.   Surgical incision R anterior throat that is well approximated. No sign of infection or eryhthema   Eyes: EOM are normal. Pupils are equal, round, and reactive to light.   Neck: Normal range of motion. Neck supple.   Cardiovascular: Normal rate, regular rhythm and normal heart sounds.   Pulmonary/Chest: Effort normal and breath sounds normal. No stridor. No respiratory distress.   Abdominal: Soft. There is no tenderness. There is no rebound and no guarding.   Musculoskeletal: Normal range of motion. He exhibits no edema.   Neurological: He is alert and oriented to person, place, and time. He has normal sensation and normal strength.   Skin: Skin is warm and dry. Bruising (anterior neck c/w previous surgery. No induration) noted.   Psychiatric: Mood and affect normal.   Nursing note and vitals reviewed.      LAB RESULTS  Lab Results (last 24 hours)     Procedure Component Value Units Date/Time    CBC & Differential [[id number]] Collected:  01/07/19 1021    Specimen:  Blood Updated:  01/07/19 1103    Narrative:       The following orders were created for panel order CBC & Differential.  Procedure                               Abnormality         Status                     ---------                               -----------         ------                     CBC Auto Differential[[id number]]        Abnormal            Final result                 Please view results for these tests on the individual orders.    Comprehensive Metabolic Panel [[id number]]  (Abnormal) Collected:  01/07/19 1021    Specimen:  Blood Updated:  01/07/19 1107     Glucose 131 mg/dL      BUN 22 mg/dL      Creatinine 1.42 mg/dL      Sodium 142 mmol/L      Potassium 4.4 mmol/L      Chloride 103 mmol/L      CO2 27.2 mmol/L      Calcium 10.0 mg/dL      Total Protein 7.8 g/dL      Albumin  4.10 g/dL      ALT (SGPT) 38 U/L      AST (SGOT) 25 U/L      Alkaline Phosphatase 76 U/L      Total Bilirubin 1.0 mg/dL      eGFR Non African Amer 50 mL/min/1.73      Globulin 3.7 gm/dL      A/G Ratio 1.1 g/dL      BUN/Creatinine Ratio 15.5     Anion Gap 11.8 mmol/L     Troponin [[id number]]  (Normal) Collected:  01/07/19 1021    Specimen:  Blood Updated:  01/07/19 1107     Troponin T <0.010 ng/mL     Narrative:       Troponin T Reference Ranges:  Less than 0.03 ng/mL:    Negative for AMI  0.03 to 0.09 ng/mL:      Indeterminant for AMI  Greater than 0.09 ng/mL: Positive for AMI    BNP [[id number]]  (Abnormal) Collected:  01/07/19 1021    Specimen:  Blood Updated:  01/07/19 1105     proBNP 1,014.0 pg/mL     Narrative:       Among patients with dyspnea, NT-proBNP is highly sensitive for the detection of acute congestive heart failure. In addition NT-proBNP of <300 pg/ml effectively rules out acute congestive heart failure with 99% negative predictive value.    CBC Auto Differential [[id number]]  (Abnormal) Collected:  01/07/19 1021    Specimen:  Blood Updated:  01/07/19 1103     WBC 10.14 10*3/mm3      RBC 5.52 10*6/mm3      Hemoglobin 17.3 g/dL      Hematocrit 49.9 %      MCV 90.4 fL      MCH 31.3 pg      MCHC 34.7 g/dL      RDW 13.2 %      RDW-SD 43.3 fl      MPV 11.2 fL      Platelets 230 10*3/mm3      Neutrophil % 61.1 %      Lymphocyte % 26.6 %      Monocyte % 11.4 %      Eosinophil % 0.7 %      Basophil % 0.2 %      Immature Grans % 0.5 %      Neutrophils, Absolute 6.19 10*3/mm3      Lymphocytes, Absolute 2.70 10*3/mm3      Monocytes, Absolute 1.16 10*3/mm3      Eosinophils, Absolute 0.07 10*3/mm3      Basophils, Absolute 0.02 10*3/mm3      Immature Grans, Absolute 0.05 10*3/mm3     Influenza Antigen, Rapid - Swab, Nasopharynx [[id number]]  (Normal) Collected:  01/07/19 1122    Specimen:  Swab from Nasopharynx Updated:  01/07/19 1147     Influenza A Ag, EIA Negative     Influenza B Ag, EIA Negative    Urinalysis  With Microscopic If Indicated (No Culture) - Urine, Clean Catch [[id number]]  (Abnormal) Collected:  01/07/19 1259    Specimen:  Urine, Clean Catch Updated:  01/07/19 1314     Color, UA Dark Yellow     Appearance, UA Clear     pH, UA 5.5     Specific Gravity, UA >=1.030     Glucose, UA Negative     Ketones, UA Trace     Bilirubin, UA Negative     Blood, UA Negative     Protein, UA Negative     Leuk Esterase, UA Negative     Nitrite, UA Negative     Urobilinogen, UA 1.0 E.U./dL    Narrative:       Urine microscopic not indicated.          I ordered the above labs and reviewed the results    RADIOLOGY  CT Soft Tissue Neck With Contrast   Preliminary Result       The patient is status post a recent anterior cervical discectomy and   fusion procedure from C3 down to C7. An anterior cervical plate and   traversing screws fuse C3 down to C7. Additionally, there are interbody   prostheses in place at the level of the C3-4, C4-5, C5-6, and C6-7 disc   spaces. Anterior to the C5 through C7 vertebral bodies, there is   prominent prevertebral soft tissues measuring up to 1.9 cm in greatest   AP dimensions at the C7 level. To the extent that these findings are   representative of postoperative edema, postoperative hematoma, or even   postoperative infection cannot be accurately assessed on the basis of   this study. The findings could be potentially further characterized with   MR imaging if clinically indicated. Also, mass effect upon the esophagus   could be further assessed with a barium or Gastrografin swallow exam.       Incidental note of 20% NASCET stenosis within the proximal aspect of the   left internal carotid artery.       These findings and recommendations are discussed with Jordyn Zeng on   01/07/2019 at approximately 12:45 PM.       Radiation dose reduction techniques were utilized, including automated   exposure control and exposure modulation based on body size.              XR Chest 2 View   Final Result    TWO-VIEW CHEST     HISTORY: Shortness of breath.     FINDINGS:  The lungs are well-expanded and clear and the heart and hilar  structures are normal. There is no acute disease.     This report was finalized on 1/7/2019 12:56 PM by Dr. Manuel Medina M.D.           I ordered the above noted radiological studies. Interpreted by radiologist. Discussed with radiologist (Dr. Lamb). Reviewed by me in PACS.       PROCEDURES  Procedures      PROGRESS AND CONSULTS     10:35 AM  Ordered flu swab, blood work, EKG, and CXR.   11:33 AM  Reviewed pt's history and workup with Dr. Aguilera.  After a bedside evaluation; Dr Aguilera agrees with the plan of care. Will get CT neck.  2:55 PM  Pt will be admitted.      MEDICAL DECISION MAKING  Results were reviewed/discussed with the patient and they were also made aware of online access. Pt also made aware that some labs, such as cultures, will not be resulted during ER visit and follow up with PMD is necessary.     MDM  Number of Diagnoses or Management Options     Amount and/or Complexity of Data Reviewed  Review and summarize past medical records: yes (C3 - C7 ANTERIOR CERVICAL DISCECTOMY AND FUSION by Dr. Mathews on 1/2/18)           DIAGNOSIS  Final diagnoses:   Near syncope   Post-op pain   Dyspnea, unspecified type       DISPOSITION  ADMISSION    Discussed treatment plan and reason for admission with pt/family and admitting physician.  Pt/family voiced understanding of the plan for admission for further testing/treatment as needed.         Latest Documented Vital Signs:  As of 8:08 PM  BP- 140/87 HR- 65 Temp- 97.7 °F (36.5 °C) (Oral) O2 sat- 96%    --  Documentation assistance provided by anthony Webster for AZALIA Yang.  Information recorded by the anthony was done at my direction and has been verified and validated by me.         Melo Webster  01/07/19 1932       Jordyn Zeng APRN  01/07/19 2008

## 2019-01-08 LAB
ANION GAP SERPL CALCULATED.3IONS-SCNC: 10 MMOL/L
BASOPHILS # BLD AUTO: 0.03 10*3/MM3 (ref 0–0.2)
BASOPHILS NFR BLD AUTO: 0.4 % (ref 0–1.5)
BUN BLD-MCNC: 20 MG/DL (ref 8–23)
BUN/CREAT SERPL: 18.3 (ref 7–25)
CALCIUM SPEC-SCNC: 9 MG/DL (ref 8.6–10.5)
CHLORIDE SERPL-SCNC: 104 MMOL/L (ref 98–107)
CO2 SERPL-SCNC: 25 MMOL/L (ref 22–29)
CREAT BLD-MCNC: 1.09 MG/DL (ref 0.76–1.27)
DEPRECATED RDW RBC AUTO: 45.1 FL (ref 37–54)
EOSINOPHIL # BLD AUTO: 0.12 10*3/MM3 (ref 0–0.7)
EOSINOPHIL NFR BLD AUTO: 1.5 % (ref 0.3–6.2)
ERYTHROCYTE [DISTWIDTH] IN BLOOD BY AUTOMATED COUNT: 13.5 % (ref 11.5–14.5)
GFR SERPL CREATININE-BSD FRML MDRD: 67 ML/MIN/1.73
GLUCOSE BLD-MCNC: 108 MG/DL (ref 65–99)
GLUCOSE BLDC GLUCOMTR-MCNC: 152 MG/DL (ref 70–130)
GLUCOSE BLDC GLUCOMTR-MCNC: 185 MG/DL (ref 70–130)
HCT VFR BLD AUTO: 43.1 % (ref 40.4–52.2)
HGB BLD-MCNC: 14.3 G/DL (ref 13.7–17.6)
IMM GRANULOCYTES # BLD AUTO: 0.04 10*3/MM3 (ref 0–0.03)
IMM GRANULOCYTES NFR BLD AUTO: 0.5 % (ref 0–0.5)
LYMPHOCYTES # BLD AUTO: 2.24 10*3/MM3 (ref 0.9–4.8)
LYMPHOCYTES NFR BLD AUTO: 27.2 % (ref 19.6–45.3)
MCH RBC QN AUTO: 30.5 PG (ref 27–32.7)
MCHC RBC AUTO-ENTMCNC: 33.2 G/DL (ref 32.6–36.4)
MCV RBC AUTO: 91.9 FL (ref 79.8–96.2)
MONOCYTES # BLD AUTO: 1.06 10*3/MM3 (ref 0.2–1.2)
MONOCYTES NFR BLD AUTO: 12.8 % (ref 5–12)
NEUTROPHILS # BLD AUTO: 4.8 10*3/MM3 (ref 1.9–8.1)
NEUTROPHILS NFR BLD AUTO: 58.1 % (ref 42.7–76)
PLATELET # BLD AUTO: 196 10*3/MM3 (ref 140–500)
PMV BLD AUTO: 11 FL (ref 6–12)
POTASSIUM BLD-SCNC: 4.5 MMOL/L (ref 3.5–5.2)
RBC # BLD AUTO: 4.69 10*6/MM3 (ref 4.6–6)
SODIUM BLD-SCNC: 139 MMOL/L (ref 136–145)
WBC NRBC COR # BLD: 8.25 10*3/MM3 (ref 4.5–10.7)

## 2019-01-08 PROCEDURE — 25010000002 DEXAMETHASONE PER 1 MG: Performed by: PHYSICIAN ASSISTANT

## 2019-01-08 PROCEDURE — 25810000003 SODIUM CHLORIDE 0.9 % WITH KCL 20 MEQ 20-0.9 MEQ/L-% SOLUTION: Performed by: INTERNAL MEDICINE

## 2019-01-08 PROCEDURE — 25010000003 CEFAZOLIN 1-4 GM/50ML-% SOLUTION: Performed by: INTERNAL MEDICINE

## 2019-01-08 PROCEDURE — 92610 EVALUATE SWALLOWING FUNCTION: CPT

## 2019-01-08 PROCEDURE — 80048 BASIC METABOLIC PNL TOTAL CA: CPT | Performed by: INTERNAL MEDICINE

## 2019-01-08 PROCEDURE — 85025 COMPLETE CBC W/AUTO DIFF WBC: CPT | Performed by: INTERNAL MEDICINE

## 2019-01-08 PROCEDURE — 63710000001 INSULIN LISPRO (HUMAN) PER 5 UNITS: Performed by: HOSPITALIST

## 2019-01-08 PROCEDURE — 82962 GLUCOSE BLOOD TEST: CPT

## 2019-01-08 RX ORDER — SODIUM CHLORIDE 9 MG/ML
100 INJECTION, SOLUTION INTRAVENOUS CONTINUOUS
Status: DISCONTINUED | OUTPATIENT
Start: 2019-01-08 | End: 2019-01-09

## 2019-01-08 RX ORDER — DEXAMETHASONE SODIUM PHOSPHATE 4 MG/ML
4 INJECTION, SOLUTION INTRA-ARTICULAR; INTRALESIONAL; INTRAMUSCULAR; INTRAVENOUS; SOFT TISSUE EVERY 6 HOURS
Status: DISCONTINUED | OUTPATIENT
Start: 2019-01-08 | End: 2019-01-09

## 2019-01-08 RX ORDER — NICOTINE POLACRILEX 4 MG
15 LOZENGE BUCCAL
Status: DISCONTINUED | OUTPATIENT
Start: 2019-01-08 | End: 2019-01-10 | Stop reason: HOSPADM

## 2019-01-08 RX ORDER — DEXTROSE MONOHYDRATE 25 G/50ML
25 INJECTION, SOLUTION INTRAVENOUS
Status: DISCONTINUED | OUTPATIENT
Start: 2019-01-08 | End: 2019-01-10 | Stop reason: HOSPADM

## 2019-01-08 RX ADMIN — HYDROCODONE BITARTRATE AND ACETAMINOPHEN 1 TABLET: 5; 325 TABLET ORAL at 06:12

## 2019-01-08 RX ADMIN — METOPROLOL SUCCINATE 50 MG: 50 TABLET, FILM COATED, EXTENDED RELEASE ORAL at 21:34

## 2019-01-08 RX ADMIN — POTASSIUM CHLORIDE AND SODIUM CHLORIDE 100 ML/HR: 900; 150 INJECTION, SOLUTION INTRAVENOUS at 09:49

## 2019-01-08 RX ADMIN — SODIUM CHLORIDE 100 ML/HR: 9 INJECTION, SOLUTION INTRAVENOUS at 14:32

## 2019-01-08 RX ADMIN — SODIUM CHLORIDE, PRESERVATIVE FREE 3 ML: 5 INJECTION INTRAVENOUS at 09:49

## 2019-01-08 RX ADMIN — HYDROCODONE BITARTRATE AND ACETAMINOPHEN 1 TABLET: 5; 325 TABLET ORAL at 21:34

## 2019-01-08 RX ADMIN — SODIUM CHLORIDE, PRESERVATIVE FREE 3 ML: 5 INJECTION INTRAVENOUS at 21:40

## 2019-01-08 RX ADMIN — AMLODIPINE BESYLATE 5 MG: 5 TABLET ORAL at 07:58

## 2019-01-08 RX ADMIN — DOCUSATE SODIUM 100 MG: 100 CAPSULE, LIQUID FILLED ORAL at 21:35

## 2019-01-08 RX ADMIN — INSULIN LISPRO 2 UNITS: 100 INJECTION, SOLUTION INTRAVENOUS; SUBCUTANEOUS at 21:34

## 2019-01-08 RX ADMIN — ASPIRIN 81 MG: 81 TABLET, DELAYED RELEASE ORAL at 08:02

## 2019-01-08 RX ADMIN — ALLOPURINOL 300 MG: 300 TABLET ORAL at 07:58

## 2019-01-08 RX ADMIN — CEFAZOLIN SODIUM 1 G: 1 INJECTION, SOLUTION INTRAVENOUS at 06:07

## 2019-01-08 RX ADMIN — DOCUSATE SODIUM 100 MG: 100 CAPSULE, LIQUID FILLED ORAL at 08:03

## 2019-01-08 RX ADMIN — DEXAMETHASONE SODIUM PHOSPHATE 4 MG: 4 INJECTION, SOLUTION INTRAMUSCULAR; INTRAVENOUS at 17:47

## 2019-01-08 RX ADMIN — DEXAMETHASONE SODIUM PHOSPHATE 4 MG: 4 INJECTION, SOLUTION INTRAMUSCULAR; INTRAVENOUS at 21:36

## 2019-01-08 RX ADMIN — DEXAMETHASONE SODIUM PHOSPHATE 4 MG: 4 INJECTION, SOLUTION INTRAMUSCULAR; INTRAVENOUS at 11:37

## 2019-01-08 NOTE — PROGRESS NOTES
"    DAILY PROGRESS NOTE  [facility]    Patient Identification:  Name: Kristian Burgos  Age: 68 y.o.  Sex: male  :  1950  MRN: [id number]         Primary Care Physician: Jaylen Dye MD    Subjective:  Interval History: feeling much btetter today - no ams/n/v - ate lunch today w/out issue (plate was empty)    Objective:spouse at bs     Scheduled Meds:  allopurinol 300 mg Oral QAM   amLODIPine 5 mg Oral QAM   aspirin 81 mg Oral Daily   dexamethasone 4 mg Intravenous Q6H   docusate sodium 100 mg Oral BID   metoprolol succinate XL 50 mg Oral Nightly   sodium chloride 3 mL Intravenous Q12H     Continuous Infusions:  Sodium chloride 100 mL/hr       Vital signs in last 24 hours:  Temp:  [97.6 °F (36.4 °C)-98.6 °F (37 °C)] 98.6 °F (37 °C)  Heart Rate:  [65-77] 66  Resp:  [14-18] 18  BP: (119-152)/(81-94) 145/84    Intake/Output:    Intake/Output Summary (Last 24 hours) at 2019 1358  Last data filed at 2019 2139  Gross per 24 hour   Intake 1000 ml   Output --   Net 1000 ml       Exam:  /84 (BP Location: Left arm, Patient Position: Lying)   Pulse 66   Temp 98.6 °F (37 °C) (Oral)   Resp 18   Ht 185.4 cm (72.99\")   Wt 95.5 kg (210 lb 8.6 oz)   SpO2 94%   BMI 27.78 kg/m²     General Appearance:    Alert, cooperative, no distress, AAOx3, not toxic, conversational                          Head:    Normocephalic, without obvious abnormality, atraumatic                           Eyes:    PERRL, conjunctiva/corneas clear, EOM's intact, both eyes                         Throat:   Surgical neck scar healing, ecchymosis to anterior neck w/out definite focal consolidation                         Lungs:    Clear to auscultation bilaterally, respirations unlabored                          Heart:    Regular rate and rhythm, S1 and S2 normal                  Abdomen:     Soft, non-tender, bowel sounds active                 Extremities:   Extremities normal, atraumatic, no cyanosis or edema    "               Neurologic:   CNII-XII intact, moving all w/out focal deficits noted     Data Review:  Labs in chart were reviewed.    Assessment:  Active Hospital Problems    Diagnosis Date Noted   • Post-operative pain [G89.18] 01/07/2019   • Neck swelling [R22.1] 01/07/2019   • Dehydration [E86.0] 01/07/2019   • Essential hypertension [I10] 01/07/2019   • Atrial fibrillation (CMS/HCC) [I48.91] 01/07/2019   • Pre-diabetes [R73.03] 01/07/2019      Resolved Hospital Problems   No resolved problems to display.       Plan:  NO pneumonia - DC abx     Agree w/ steroid for postop complications/edema    VFSS per Ortho but doubtful needed as clinically improved and ST has advanced diet and patient is tolerating     IVF adjusted but continue at current rate     PAF - off AC due to hematoma - await surgical recs on when OK to resume OR how long he should remain off     HTN - probable restart ACE tomorrow - crt back to normal     A1c 5.9 - add ssi for now in case reactive hyperglycemia     Dispo - need surgical evaluation     Dakota Anne MD  1/8/2019  1:58 PM

## 2019-01-08 NOTE — CONSULTS
Reason for Consultation: Postoperative neck pain and dysphagia    Patient Care Team:  Jaylen Dye MD as PCP - General (Family Medicine)  Truong Dubose MD (Cardiology)    Chief complaint Postoperative neck pain and dysphagia      Subjective .     History of present illness: The patient is a 68-year-old male who underwent ACDF C3-C7 on 2019. He develops expected postoperative dysphagia and hoarseness which seem to be improving. His severe headaches immediately resolved.  He denies arm pain.  He complains of postoperative Neck pain.  On 2018 he drank a glass of water before bed laid down and developed worsening neck and chest pain and a sensation of gargling. He reports feeling chills and nausea.He was subsequently unable to eat or drink due to severe dysphagia. He went to the emergency room and was admitted.    Review of Systems  Pertinent items are noted in HPI    History  Past Medical History:   Diagnosis Date   • Alteration in anticoagulation     on eloquis   • Arthritis    • Atrial fibrillation (CMS/HCC)    • Hypertension    • Migraines    • Neck pain    ,   Past Surgical History:   Procedure Laterality Date   • ANTERIOR CERVICAL DISCECTOMY W/ FUSION N/A 2019    Procedure: C3 - C7 ANTERIOR CERVICAL DISCECTOMY AND FUSION;  Surgeon: Bret Mathews DO;  Location: [facility] OR;  Service: Orthopedic Spine   • APPENDECTOMY     • CIRCUMCISION     • COLONOSCOPY     • ENDOSCOPY     • GUM SURGERY     ,   Family History   Problem Relation Age of Onset   • Malig Hyperthermia Neg Hx    ,   Social History     Tobacco Use   • Smoking status: Former Smoker     Packs/day: 1.00     Years: 10.00     Pack years: 10.00     Types: Cigarettes     Last attempt to quit:      Years since quittin.0   • Smokeless tobacco: Never Used   Substance Use Topics   • Alcohol use: Yes     Alcohol/week: 1.2 oz     Types: 2 Cans of beer per week   • Drug use: No   ,   Medications Prior to Admission    Medication Sig Dispense Refill Last Dose   • acetaminophen (TYLENOL) 500 MG tablet Take 1,000 mg by mouth Every 6 (Six) Hours As Needed for Mild Pain .   1/6/2019 at Unknown time   • allopurinol (ZYLOPRIM) 300 MG tablet Take 300 mg by mouth Every Morning.   1/6/2019 at Unknown time   • amLODIPine (NORVASC) 5 MG tablet Take 5 mg by mouth Every Morning.   1/6/2019 at Unknown time   • apixaban (ELIQUIS) 5 MG tablet tablet Take 5 mg by mouth 2 (Two) Times a Day.   1/6/2019 at Unknown time   • aspirin 81 MG tablet Take 81 mg by mouth Daily.   1/6/2019 at Unknown time   • Cyanocobalamin (VITAMIN B-12 IJ) Inject 1,000 mcg as directed Every 30 (Thirty) Days.   Past Month at Unknown time   • HYDROcodone-acetaminophen (NORCO) 5-325 MG per tablet Take 1 tablet by mouth Every 6 (Six) Hours As Needed for Moderate Pain  for up to 9 days. 40 tablet 0 1/6/2019 at Unknown time   • lisinopril (PRINIVIL,ZESTRIL) 20 MG tablet Take 20 mg by mouth Every Morning.   1/6/2019 at Unknown time   • metoprolol succinate XL (TOPROL-XL) 100 MG 24 hr tablet Take 50 mg by mouth Every Night.   1/6/2019 at Unknown time   , Scheduled Meds:    allopurinol 300 mg Oral QAM   amLODIPine 5 mg Oral QAM   aspirin 81 mg Oral Daily   ceFAZolin 1 g Intravenous Q8H   docusate sodium 100 mg Oral BID   metoprolol succinate XL 50 mg Oral Nightly   sodium chloride 3 mL Intravenous Q12H   , Continuous Infusions:    sodium chloride 0.9 % with KCl 20 mEq 100 mL/hr Last Rate: 100 mL/hr (01/07/19 9419)   , PRN Meds:  •  acetaminophen  •  bisacodyl  •  calcium carbonate  •  HYDROcodone-acetaminophen  •  HYDROmorphone **AND** naloxone  •  nitroglycerin  •  ondansetron **OR** ondansetron ODT **OR** ondansetron  •  [COMPLETED] Insert peripheral IV **AND** sodium chloride  •  sodium chloride and Allergies:  Crestor [rosuvastatin]    Objective     Vital Signs   Temp:  [97 °F (36.1 °C)-98.5 °F (36.9 °C)] 98.5 °F (36.9 °C)  Heart Rate:  [] 66  Resp:  [14-18] 18  BP:  ()/(72-99) 152/86    Physical Exam:   General Appearance alert, appears stated age and cooperative  Head normocephalic, without obvious abnormality and atraumatic  Neck diffuse ecchymosis over the anterior neck and swelling. carotid approach incision is noted which is dry and well approximated.d  Extremities moves extremities well, no edema, no cyanosis and no redness  Pulses Pulses palpable and equal bilaterally  Skin ecchymosis over the anterior neck  Neurologic Mental Status orientated to person, place, time and situation, Speech is slightly hoarse, Sensory intact, Motor strength is equal in upper and lower extremities    Results Review:    CT scan soft tissue of the neck: Anterior to the C5 through C7 vertebral bodies, there are prominent prevertebral soft tissues measuring up to 1.9 cm in greatest AP dimensions at the C7 level. To the extent that these findings are representative of postoperative edema, postoperative hematoma, or even  postoperative infection cannot be accurately assessed on the basis of this study.  WBC   Date Value Ref Range Status   01/08/2019 8.25 4.50 - 10.70 10*3/mm3 Final     RBC   Date Value Ref Range Status   01/08/2019 4.69 4.60 - 6.00 10*6/mm3 Final     Hemoglobin   Date Value Ref Range Status   01/08/2019 14.3 13.7 - 17.6 g/dL Final     Hematocrit   Date Value Ref Range Status   01/08/2019 43.1 40.4 - 52.2 % Final     MCV   Date Value Ref Range Status   01/08/2019 91.9 79.8 - 96.2 fL Final     MCH   Date Value Ref Range Status   01/08/2019 30.5 27.0 - 32.7 pg Final     MCHC   Date Value Ref Range Status   01/08/2019 33.2 32.6 - 36.4 g/dL Final     RDW   Date Value Ref Range Status   01/08/2019 13.5 11.5 - 14.5 % Final     RDW-SD   Date Value Ref Range Status   01/08/2019 45.1 37.0 - 54.0 fl Final     MPV   Date Value Ref Range Status   01/08/2019 11.0 6.0 - 12.0 fL Final     Platelets   Date Value Ref Range Status   01/08/2019 196 140 - 500 10*3/mm3 Final     Neutrophil %    Date Value Ref Range Status   01/08/2019 58.1 42.7 - 76.0 % Final     Lymphocyte %   Date Value Ref Range Status   01/08/2019 27.2 19.6 - 45.3 % Final     Monocyte %   Date Value Ref Range Status   01/08/2019 12.8 (H) 5.0 - 12.0 % Final     Eosinophil %   Date Value Ref Range Status   01/08/2019 1.5 0.3 - 6.2 % Final     Basophil %   Date Value Ref Range Status   01/08/2019 0.4 0.0 - 1.5 % Final     Immature Grans %   Date Value Ref Range Status   01/08/2019 0.5 0.0 - 0.5 % Final     Neutrophils, Absolute   Date Value Ref Range Status   01/08/2019 4.80 1.90 - 8.10 10*3/mm3 Final     Lymphocytes, Absolute   Date Value Ref Range Status   01/08/2019 2.24 0.90 - 4.80 10*3/mm3 Final     Monocytes, Absolute   Date Value Ref Range Status   01/08/2019 1.06 0.20 - 1.20 10*3/mm3 Final     Eosinophils, Absolute   Date Value Ref Range Status   01/08/2019 0.12 0.00 - 0.70 10*3/mm3 Final     Basophils, Absolute   Date Value Ref Range Status   01/08/2019 0.03 0.00 - 0.20 10*3/mm3 Final     Immature Grans, Absolute   Date Value Ref Range Status   01/08/2019 0.04 (H) 0.00 - 0.03 10*3/mm3 Final     Lab Results   Component Value Date    GLUCOSE 108 (H) 01/08/2019    BUN 20 01/08/2019    CREATININE 1.09 01/08/2019    EGFRIFNONA 67 01/08/2019    BCR 18.3 01/08/2019    K 4.5 01/08/2019    CO2 25.0 01/08/2019    CALCIUM 9.0 01/08/2019    ALBUMIN 4.10 01/07/2019    AST 25 01/07/2019    ALT 38 01/07/2019         Assessment/Plan       Post-operative pain    Neck swelling    Dehydration    Essential hypertension    Atrial fibrillation (CMS/HCC)    Pre-diabetes    I am ordering a video swallow study with speech therapy.  He will remain nothing by mouth until we have these results.  He will be started on Decadron 4 mg every 6 hours.  I discussed the patients findings and my recommendations with patient, nursing staff and Dr. Bisi Mathews, DO  01/08/19  7:51 AM

## 2019-01-08 NOTE — PROGRESS NOTES
Discharge Planning Assessment  [facility]     Patient Name: Kristian Burgos  MRN: [id number]  Today's Date: 1/8/2019    Admit Date: 1/7/2019    Discharge Needs Assessment     Row Name 01/08/19 1517       Living Environment    Lives With  spouse    Current Living Arrangements  home/apartment/condo    Primary Care Provided by  self    Provides Primary Care For  no one    Family Caregiver if Needed  spouse    Quality of Family Relationships  helpful;involved;supportive    Able to Return to Prior Arrangements  yes       Resource/Environmental Concerns    Resource/Environmental Concerns  none    Transportation Concerns  car, none       Transition Planning    Patient/Family Anticipates Transition to  home with family    Patient/Family Anticipated Services at Transition  none    Transportation Anticipated  family or friend will provide       Discharge Needs Assessment    Concerns to be Addressed  no discharge needs identified;denies needs/concerns at this time    Equipment Currently Used at Home  none    Discharge Coordination/Progress  Home        Discharge Plan     Row Name 01/08/19 1517       Plan    Plan  Home with wife    Patient/Family in Agreement with Plan  yes    Plan Comments  IMM letter checked. CCP met with pt and wife (Danielle Burgos, [phone]) to discuss d/c planning. Facesheet verified. CCP role explained. Pt resides with his wife in a two level home, denies DME use, and reports no past home health or sub-acute rehab. Pt has had outpatient PT previously (unable to recall facility name). Pt confirms [facility] [place], [place]. Pt denies known needs at this time. CCP to follow to assist should d/c needs arise. Vida Toth [facility]        Destination      No service coordination in this encounter.      Durable Medical Equipment      No service coordination in this encounter.      Dialysis/Infusion      No service coordination in this encounter.      Home Medical Care      No service coordination  in this encounter.      Community Resources      No service coordination in this encounter.          Demographic Summary     Row Name 01/08/19 1516       General Information    Admission Type  inpatient    Arrived From  home    Required Notices Provided  Important Message from Medicare    Referral Source  admission list    Reason for Consult  discharge planning    Preferred Language  English        Functional Status     Row Name 01/08/19 1517       Functional Status    Usual Activity Tolerance  good    Current Activity Tolerance  good       Functional Status, IADL    Medications  independent    Meal Preparation  independent    Housekeeping  independent    Laundry  independent    Shopping  independent       Mental Status Summary    Recent Changes in Mental Status/Cognitive Functioning  no changes        Psychosocial    No documentation.       Abuse/Neglect    No documentation.       Legal    No documentation.       Substance Abuse    No documentation.       Patient Forms    No documentation.           Tiffanie Toth LCSW

## 2019-01-08 NOTE — PLAN OF CARE
Problem: Patient Care Overview  Goal: Plan of Care Review  Outcome: Ongoing (interventions implemented as appropriate)   01/08/19 0653   Coping/Psychosocial   Plan of Care Reviewed With patient   Coping/Psychosocial   Patient Agreement with Plan of Care agrees   Plan of Care Review   Progress no change   OTHER   Outcome Summary Vss and on RA. Ambulating independently with a steady gait. Difficulty swallowing at times. NPO except ice chips. Pain only when patient swallows. No distress noted.

## 2019-01-08 NOTE — THERAPY EVALUATION
Acute Care - Speech Language Pathology   Swallow Initial Evaluation [facility]     Patient Name: Kristian Burgso  : 1950  MRN: [id number]  Today's Date: 2019               Admit Date: 2019    Visit Dx:     ICD-10-CM ICD-9-CM   1. Near syncope R55 780.2   2. Post-op pain G89.18 338.18   3. Dyspnea, unspecified type R06.00 786.09     Patient Active Problem List   Diagnosis   • Post-operative pain   • Neck swelling   • Dehydration   • Essential hypertension   • Atrial fibrillation (CMS/HCC)   • Pre-diabetes     Past Medical History:   Diagnosis Date   • Alteration in anticoagulation     on eloquis   • Arthritis    • Atrial fibrillation (CMS/HCC)    • Hypertension    • Migraines    • Neck pain      Past Surgical History:   Procedure Laterality Date   • ANTERIOR CERVICAL DISCECTOMY W/ FUSION N/A 2019    Procedure: C3 - C7 ANTERIOR CERVICAL DISCECTOMY AND FUSION;  Surgeon: Bret Mathews DO;  Location: [facility] MAIN OR;  Service: Orthopedic Spine   • APPENDECTOMY     • CIRCUMCISION     • COLONOSCOPY     • ENDOSCOPY     • GUM SURGERY          SWALLOW EVALUATION (last 72 hours)      SLP Adult Swallow Evaluation     Row Name 19 1100                   Rehab Evaluation    Document Type  evaluation  -AW        Subjective Information  no complaints  -AW        Patient Observations  alert;cooperative;agree to therapy  -AW        Patient/Family Observations  Pt upright in recliner, wife present.  -AW        Patient Effort  good  -AW        Symptoms Noted During/After Treatment  none  -AW           General Information    Patient Profile Reviewed  yes  -AW        Pertinent History Of Current Problem  Pt s/p cervical spine decompression 19, admitted with near syncope, difficulty swallowing.  -AW        Current Method of Nutrition  NPO  -AW        Prior Level of Function-Swallowing  no diet consistency restrictions  -AW        Plans/Goals Discussed with  patient;spouse/S.O.;agreed upon  -AW         Barriers to Rehab  none identified  -AW        Patient's Goals for Discharge  return to all previous roles/activities  -AW        Family Goals for Discharge  patient able to return to all previous activities/roles  -AW           Pain Assessment    Additional Documentation  Pain Scale: Numbers Pre/Post-Treatment (Group)  -AW           Pain Scale: Numbers Pre/Post-Treatment    Pain Scale: Numbers, Pretreatment  0/10 - no pain  -AW        Pain Scale: Numbers, Post-Treatment  0/10 - no pain  -AW           Oral Motor and Function    Dentition Assessment  natural, present and adequate  -AW        Secretion Management  WNL/WFL  -AW        Mucosal Quality  moist, healthy  -AW        Volitional Swallow  WFL  -AW        Volitional Cough  WFL  -AW           Oral Musculature and Cranial Nerve Assessment    Oral Motor General Assessment  WFL  -AW           General Eating/Swallowing Observations    Respiratory Support Currently in Use  room air  -AW        Eating/Swallowing Skills  self-fed  -AW        Positioning During Eating  upright in chair  -AW        Utensils Used  spoon;cup;straw  -AW        Consistencies Trialed  regular textures;soft textures;pureed;thin liquids  -AW           Clinical Swallow Eval    Oral Prep Phase  WFL  -AW        Oral Transit  WFL  -AW        Oral Residue  WFL  -AW        Pharyngeal Phase  suspected pharyngeal impairment  -AW        Clinical Swallow Evaluation Summary  Bedside Swallow eval completed. Pt reported an improvement in symptoms since yesterday. Pt is guarded with swallowing. No s/s noted with thin (cup/straw), pureed, soft, or regular textures. Pt spontaneously dry swallows as needed to clear suspected pharyngeal residuals. Regular textures more difficult to swallow. Recommend [facility] soft diet and thin liquids. Order received for VFSS; will schedule for 1/9.  -AW           Clinical Impression    SLP Swallowing Diagnosis  suspected pharyngeal dysfunction  -AW        Functional Impact  risk  of aspiration/pneumonia  -AW        Rehab Potential/Prognosis, Swallowing  good, to achieve stated therapy goals  -AW        Swallow Criteria for Skilled Therapeutic Interventions Met  demonstrates skilled criteria  -AW           Recommendations    Therapy Frequency (Swallow)  PRN  -AW        Predicted Duration Therapy Intervention (Days)  until discharge  -AW        SLP Diet Recommendation  soft textures;ground;thin liquids;other (see comments) pt preferred starting on ground meat  -AW        Recommended Diagnostics  VFSS (MBS);other (see comments) ordered per MD  -AW        Recommended Precautions and Strategies  upright posture during/after eating;small bites of food and sips of liquid;multiple swallows per bite of food;multiple swallows per sip of liquid  -AW        SLP Rec. for Method of Medication Administration  meds whole;with thin liquids;with pudding or applesauce;as tolerated  -AW        Monitor for Signs of Aspiration  yes;notify SLP if any concerns  -AW        Anticipated Dischage Disposition  home with assist  -AW          User Key  (r) = Recorded By, (t) = Taken By, (c) = Cosigned By    Initials Name Effective Dates    Solange Mcgraw, MS CCC-SLP 06/08/18 -           EDUCATION  The patient has been educated in the following areas:   Dysphagia (Swallowing Impairment) Oral Care/Hydration Modified Diet Instruction.    SLP Recommendation and Plan  SLP Swallowing Diagnosis: suspected pharyngeal dysfunction  SLP Diet Recommendation: soft textures, ground, thin liquids, other (see comments)(pt preferred starting on ground meat)  Recommended Precautions and Strategies: upright posture during/after eating, small bites of food and sips of liquid, multiple swallows per bite of food, multiple swallows per sip of liquid     Monitor for Signs of Aspiration: yes, notify SLP if any concerns  Recommended Diagnostics: VFSS (MBS), other (see comments)(ordered per MD)  Swallow Criteria for Skilled Therapeutic  Interventions Met: demonstrates skilled criteria  Anticipated Dischage Disposition: home with assist  Rehab Potential/Prognosis, Swallowing: good, to achieve stated therapy goals  Therapy Frequency (Swallow): PRN  Predicted Duration Therapy Intervention (Days): until discharge       Plan of Care Reviewed With: patient, spouse  Plan of Care Review  Plan of Care Reviewed With: patient, spouse  Progress: improving  Outcome Summary: Bedside Swallow eval completed. Pt reported an improvement in symptoms since yesterday. Pt is guarded with swallowing. No s/s noted with thin (cup/straw), pureed, soft, or regular textures. Pt spontaneously dry swallows as needed to clear suspected pharyngeal residuals. Regular textures more difficult to swallow. Recommend [facility] soft diet and thin liquids. Order received for VFSS; will schedule for 1/9.         SLP Outcome Measures (last 72 hours)      SLP Outcome Measures     Row Name 01/08/19 1100             SLP Outcome Measures    Outcome Measure Used?  Adult NOMS  -AW         Adult FCM Scores    FCM Chosen  Swallowing  -AW      Swallowing FCM Score  5  -AW        User Key  (r) = Recorded By, (t) = Taken By, (c) = Cosigned By    Initials Name Effective Dates    Solange Mcgraw MS CCC-SLP 06/08/18 -            Time Calculation:   Time Calculation- SLP     Row Name 01/08/19 1122             Time Calculation- SLP    SLP Start Time  1000  -AW      SLP Stop Time  1100  -AW      SLP Time Calculation (min)  60 min  -      SLP Received On  01/08/19  -        User Key  (r) = Recorded By, (t) = Taken By, (c) = Cosigned By    Initials Name Provider Type    Solange Mcgraw MS CCC-SLP Speech and Language Pathologist          Therapy Charges for Today     Code Description Service Date Service Provider Modifiers Qty    04630567112  ST EVAL ORAL PHARYNG SWALLOW 4 1/8/2019 Solange Lopez MS CCC-SLP GN 1               Solange Lopez MS CCC-SLP  1/8/2019

## 2019-01-09 LAB
GLUCOSE BLDC GLUCOMTR-MCNC: 123 MG/DL (ref 70–130)
GLUCOSE BLDC GLUCOMTR-MCNC: 147 MG/DL (ref 70–130)
GLUCOSE BLDC GLUCOMTR-MCNC: 150 MG/DL (ref 70–130)
GLUCOSE BLDC GLUCOMTR-MCNC: 172 MG/DL (ref 70–130)

## 2019-01-09 PROCEDURE — 94799 UNLISTED PULMONARY SVC/PX: CPT

## 2019-01-09 PROCEDURE — 82962 GLUCOSE BLOOD TEST: CPT

## 2019-01-09 PROCEDURE — 25010000002 DEXAMETHASONE PER 1 MG: Performed by: PHYSICIAN ASSISTANT

## 2019-01-09 PROCEDURE — 92526 ORAL FUNCTION THERAPY: CPT

## 2019-01-09 PROCEDURE — 63710000001 INSULIN LISPRO (HUMAN) PER 5 UNITS: Performed by: HOSPITALIST

## 2019-01-09 RX ORDER — DEXAMETHASONE SODIUM PHOSPHATE 4 MG/ML
2 INJECTION, SOLUTION INTRA-ARTICULAR; INTRALESIONAL; INTRAMUSCULAR; INTRAVENOUS; SOFT TISSUE EVERY 6 HOURS
Status: DISCONTINUED | OUTPATIENT
Start: 2019-01-09 | End: 2019-01-10

## 2019-01-09 RX ORDER — LISINOPRIL 20 MG/1
20 TABLET ORAL
Status: DISCONTINUED | OUTPATIENT
Start: 2019-01-09 | End: 2019-01-10 | Stop reason: HOSPADM

## 2019-01-09 RX ADMIN — DEXAMETHASONE SODIUM PHOSPHATE 2 MG: 4 INJECTION, SOLUTION INTRAMUSCULAR; INTRAVENOUS at 17:53

## 2019-01-09 RX ADMIN — APIXABAN 5 MG: 5 TABLET, FILM COATED ORAL at 15:57

## 2019-01-09 RX ADMIN — INSULIN LISPRO 2 UNITS: 100 INJECTION, SOLUTION INTRAVENOUS; SUBCUTANEOUS at 22:32

## 2019-01-09 RX ADMIN — ASPIRIN 81 MG: 81 TABLET, DELAYED RELEASE ORAL at 08:36

## 2019-01-09 RX ADMIN — INSULIN LISPRO 2 UNITS: 100 INJECTION, SOLUTION INTRAVENOUS; SUBCUTANEOUS at 17:56

## 2019-01-09 RX ADMIN — METOPROLOL SUCCINATE 50 MG: 50 TABLET, FILM COATED, EXTENDED RELEASE ORAL at 22:32

## 2019-01-09 RX ADMIN — LISINOPRIL 20 MG: 20 TABLET ORAL at 17:56

## 2019-01-09 RX ADMIN — DEXAMETHASONE SODIUM PHOSPHATE 4 MG: 4 INJECTION, SOLUTION INTRAMUSCULAR; INTRAVENOUS at 03:34

## 2019-01-09 RX ADMIN — APIXABAN 5 MG: 5 TABLET, FILM COATED ORAL at 22:31

## 2019-01-09 RX ADMIN — DOCUSATE SODIUM 100 MG: 100 CAPSULE, LIQUID FILLED ORAL at 08:36

## 2019-01-09 RX ADMIN — HYDROCODONE BITARTRATE AND ACETAMINOPHEN 1 TABLET: 5; 325 TABLET ORAL at 03:34

## 2019-01-09 RX ADMIN — DEXAMETHASONE SODIUM PHOSPHATE 2 MG: 4 INJECTION, SOLUTION INTRAMUSCULAR; INTRAVENOUS at 22:41

## 2019-01-09 RX ADMIN — AMLODIPINE BESYLATE 5 MG: 5 TABLET ORAL at 08:36

## 2019-01-09 RX ADMIN — DEXAMETHASONE SODIUM PHOSPHATE 2 MG: 4 INJECTION, SOLUTION INTRAMUSCULAR; INTRAVENOUS at 12:12

## 2019-01-09 RX ADMIN — SODIUM CHLORIDE, PRESERVATIVE FREE 3 ML: 5 INJECTION INTRAVENOUS at 08:37

## 2019-01-09 RX ADMIN — SODIUM CHLORIDE, PRESERVATIVE FREE 3 ML: 5 INJECTION INTRAVENOUS at 23:26

## 2019-01-09 RX ADMIN — ALLOPURINOL 300 MG: 300 TABLET ORAL at 08:36

## 2019-01-09 RX ADMIN — HYDROCODONE BITARTRATE AND ACETAMINOPHEN 1 TABLET: 5; 325 TABLET ORAL at 22:31

## 2019-01-09 RX ADMIN — DOCUSATE SODIUM 100 MG: 100 CAPSULE, LIQUID FILLED ORAL at 22:31

## 2019-01-09 NOTE — PROGRESS NOTES
"    DAILY PROGRESS NOTE  [facility]    Patient Identification:  Name: Kristian Burgos  Age: 68 y.o.  Sex: male  :  1950  MRN: [id number]         Primary Care Physician: Jaylen Dye MD    Subjective:  Interval History: Feels completely back to normal.  His neck pain and swelling is significantly improved.  He has excellent oh problem is tolerating his diet.  Denies chest pain palpitations or any altered mentation    Objective: Spouse at bedside    Scheduled Meds:  allopurinol 300 mg Oral QAM   amLODIPine 5 mg Oral QAM   apixaban 5 mg Oral Q12H   aspirin 81 mg Oral Daily   dexamethasone 2 mg Intravenous Q6H   docusate sodium 100 mg Oral BID   insulin lispro 0-7 Units Subcutaneous 4x Daily With Meals & Nightly   metoprolol succinate XL 50 mg Oral Nightly   sodium chloride 3 mL Intravenous Q12H     Continuous Infusions:     Vital signs in last 24 hours:  Temp:  [97.4 °F (36.3 °C)-98 °F (36.7 °C)] 97.6 °F (36.4 °C)  Heart Rate:  [78-84] 82  Resp:  [16-18] 18  BP: (145-167)/(80-92) 167/86    Intake/Output:  No intake or output data in the 24 hours ending 19 1437    Exam:  /86 (BP Location: Left arm, Patient Position: Lying)   Pulse 82   Temp 97.6 °F (36.4 °C) (Oral)   Resp 18   Ht 185.4 cm (72.99\")   Wt 95.5 kg (210 lb 8.6 oz)   SpO2 94%   BMI 27.78 kg/m²     General Appearance:    Alert, cooperative, no distress, AAOx3                        Throat:   Lips, tongue, gums normal; oral mucosa pink and moist                           Neck:   Incision looks fine and superficial bruising has significantly decreased                         Lungs:    Clear to auscultation bilaterally, respirations unlabored                           Heart:    Regular rate and rhythm, S1 and S2 normal                  Abdomen:     Soft, non-tender, bowel sounds active                 Extremities:   Extremities normal, atraumatic, no cyanosis or edema                           Data Review:  Labs in " chart were reviewed.    Assessment:  Active Hospital Problems    Diagnosis Date Noted   • Post-operative pain [G89.18] 01/07/2019   • Neck swelling [R22.1] 01/07/2019   • Dehydration [E86.0] 01/07/2019   • Essential hypertension [I10] 01/07/2019   • Atrial fibrillation (CMS/HCC) [I48.91] 01/07/2019   • Pre-diabetes [R73.03] 01/07/2019      Resolved Hospital Problems   No resolved problems to display.       Plan:  Decadron dosing per Ortho     SLIVF    Eliquis resumed as OK w/ surgery - counseled him/spouse additional 15min and advised f/up w/ his Cards MD Dr Recinos     Resume ACE as BP elevating     No significant elevation of bs w/ steroids - continue ssi - will DC on this so need to educate proper use w/ humalog pen     Dispo - DC tomorrow per surgery recs      Dakota Anne MD  1/9/2019  2:37 PM

## 2019-01-09 NOTE — THERAPY RE-EVALUATION
Acute Care - Speech Language Pathology   Swallow Re-Evaluation [facility]     Patient Name: Kristian Burgos  : 1950  MRN: [id number]  Today's Date: 2019               Admit Date: 2019    Visit Dx:     ICD-10-CM ICD-9-CM   1. Near syncope R55 780.2   2. Post-op pain G89.18 338.18   3. Dyspnea, unspecified type R06.00 786.09     Patient Active Problem List   Diagnosis   • Post-operative pain   • Neck swelling   • Dehydration   • Essential hypertension   • Atrial fibrillation (CMS/HCC)   • Pre-diabetes     Past Medical History:   Diagnosis Date   • Alteration in anticoagulation     on eloquis   • Arthritis    • Atrial fibrillation (CMS/HCC)    • Hypertension    • Migraines    • Neck pain      Past Surgical History:   Procedure Laterality Date   • ANTERIOR CERVICAL DISCECTOMY W/ FUSION N/A 2019    Procedure: C3 - C7 ANTERIOR CERVICAL DISCECTOMY AND FUSION;  Surgeon: Bret Mathews DO;  Location: [facility] MAIN OR;  Service: Orthopedic Spine   • APPENDECTOMY     • CIRCUMCISION     • COLONOSCOPY     • ENDOSCOPY     • GUM SURGERY          SWALLOW EVALUATION (last 72 hours)      SLP Adult Swallow Evaluation     Row Name 19 1400 19 1100                Rehab Evaluation    Document Type  re-evaluation  -AW  evaluation  -AW       Subjective Information  no complaints  -AW  no complaints  -AW       Patient Observations  alert;cooperative;agree to therapy  -AW  alert;cooperative;agree to therapy  -AW       Patient/Family Observations  Pt upright in recliner.  -AW  Pt upright in recliner, wife present.  -AW       Patient Effort  good  -AW  good  -AW       Symptoms Noted During/After Treatment  none  -AW  none  -AW          General Information    Patient Profile Reviewed  yes  -AW  yes  -AW       Pertinent History Of Current Problem  Pt s/p cervical spine decompression 19, admitted with near syncope, difficulty swallowing.  -AW  Pt s/p cervical spine decompression 19, admitted with near  syncope, difficulty swallowing.  -AW       Current Method of Nutrition  soft textures;ground;thin liquids  -AW  NPO  -AW       Prior Level of Function-Swallowing  no diet consistency restrictions  -AW  no diet consistency restrictions  -AW       Plans/Goals Discussed with  patient;agreed upon  -AW  patient;spouse/S.O.;agreed upon  -AW       Barriers to Rehab  none identified  -AW  none identified  -AW       Patient's Goals for Discharge  take car of myself at home  -AW  return to all previous roles/activities  -AW       Family Goals for Discharge  --  patient able to return to all previous activities/roles  -AW          Pain Assessment    Additional Documentation  Pain Scale: Numbers Pre/Post-Treatment (Group)  -AW  Pain Scale: Numbers Pre/Post-Treatment (Group)  -AW          Pain Scale: Numbers Pre/Post-Treatment    Pain Scale: Numbers, Pretreatment  0/10 - no pain  -AW  0/10 - no pain  -AW       Pain Scale: Numbers, Post-Treatment  0/10 - no pain  -AW  0/10 - no pain  -AW          Oral Motor and Function    Dentition Assessment  --  natural, present and adequate  -AW       Secretion Management  --  WNL/WFL  -AW       Mucosal Quality  --  moist, healthy  -AW       Volitional Swallow  --  WFL  -AW       Volitional Cough  --  WFL  -AW          Oral Musculature and Cranial Nerve Assessment    Oral Motor General Assessment  --  WFL  -AW          General Eating/Swallowing Observations    Respiratory Support Currently in Use  room air  -AW  room air  -AW       Eating/Swallowing Skills  self-fed  -AW  self-fed  -AW       Positioning During Eating  upright 90 degree  -AW  upright in chair  -AW       Utensils Used  spoon;cup;straw  -AW  spoon;cup;straw  -AW       Consistencies Trialed  regular textures;thin liquids  -AW  regular textures;soft textures;pureed;thin liquids  -AW          Clinical Swallow Eval    Oral Prep Phase  WFL  -AW  WFL  -AW       Oral Transit  WFL  -AW  WFL  -AW       Oral Residue  WFL  -AW  WFL  -AW        Pharyngeal Phase  no overt signs/symptoms of pharyngeal impairment  -AW  suspected pharyngeal impairment  -AW       Clinical Swallow Evaluation Summary  Pt reported an improvement in his swallowing since yesterday. Pt was scheduled for a VFSS, however, the surgeon cancelled due to pt's improvement. Pt tolerated thins via cup and straw. Mastication and bolus control/trasfer was functional for regular solids. Swallow was more natural this date, not guarded and hesitant like yesterday. Recommend diet advancement to regular. Discussed need to continue to sit up for PO and eat slowly. Pt in agreement.  -AW  Bedside Swallow eval completed. Pt reported an improvement in symptoms since yesterday. Pt is guarded with swallowing. No s/s noted with thin (cup/straw), pureed, soft, or regular textures. Pt spontaneously dry swallows as needed to clear suspected pharyngeal residuals. Regular textures more difficult to swallow. Recommend [facility] soft diet and thin liquids. Order received for VFSS; will schedule for 1/9.  -AW          Clinical Impression    SLP Swallowing Diagnosis  functional oral phase;functional pharyngeal phase  -AW  suspected pharyngeal dysfunction  -AW       Functional Impact  no impact on function  -AW  risk of aspiration/pneumonia  -AW       Rehab Potential/Prognosis, Swallowing  --  good, to achieve stated therapy goals  -AW       Swallow Criteria for Skilled Therapeutic Interventions Met  no problems identified which require skilled intervention  -AW  demonstrates skilled criteria  -AW          Recommendations    Therapy Frequency (Swallow)  evaluation only  -AW  PRN  -AW       Predicted Duration Therapy Intervention (Days)  --  until discharge  -AW       SLP Diet Recommendation  regular textures;thin liquids  -AW  soft textures;ground;thin liquids;other (see comments) pt preferred starting on ground meat  -AW       Recommended Diagnostics  --  VFSS (MBS);other (see comments) ordered per MD  -AW        Recommended Precautions and Strategies  upright posture during/after eating;small bites of food and sips of liquid  -AW  upright posture during/after eating;small bites of food and sips of liquid;multiple swallows per bite of food;multiple swallows per sip of liquid  -AW       SLP Rec. for Method of Medication Administration  meds whole;with thin liquids  -AW  meds whole;with thin liquids;with pudding or applesauce;as tolerated  -AW       Monitor for Signs of Aspiration  yes;notify SLP if any concerns  -AW  yes;notify SLP if any concerns  -AW       Anticipated Dischage Disposition  home with assist  -AW  home with assist  -AW         User Key  (r) = Recorded By, (t) = Taken By, (c) = Cosigned By    Initials Name Effective Dates    Solange Mcgraw MS CCC-SLP 06/08/18 -           EDUCATION  The patient has been educated in the following areas:   Dysphagia (Swallowing Impairment) Oral Care/Hydration.    SLP Recommendation and Plan  SLP Swallowing Diagnosis: functional oral phase, functional pharyngeal phase  SLP Diet Recommendation: regular textures, thin liquids  Recommended Precautions and Strategies: upright posture during/after eating, small bites of food and sips of liquid     Monitor for Signs of Aspiration: yes, notify SLP if any concerns     Swallow Criteria for Skilled Therapeutic Interventions Met: no problems identified which require skilled intervention  Anticipated Dischage Disposition: home with assist     Therapy Frequency (Swallow): evaluation only          Plan of Care Reviewed With: patient  Plan of Care Review  Plan of Care Reviewed With: patient  Progress: no change  Outcome Summary: Swallow reeval completed. Swallow improved since yesterday. Order received to cancel VFSS. Recommend diet advancement to regular. ST to sign off at this time.          SLP Outcome Measures (last 72 hours)      SLP Outcome Measures     Row Name 01/09/19 1400 01/08/19 1100          SLP Outcome Measures    Outcome Measure  Used?  Adult NOMS  -AW  Adult NOMS  -AW        Adult FCM Scores    FCM Chosen  Swallowing  -AW  Swallowing  -AW     Swallowing FCM Score  7  -AW  5  -AW       User Key  (r) = Recorded By, (t) = Taken By, (c) = Cosigned By    Initials Name Effective Dates    Solange Mcgraw MS CCC-SLP 06/08/18 -            Time Calculation:   Time Calculation- SLP     Row Name 01/09/19 1418             Time Calculation- SLP    SLP Start Time  1145  -AW      SLP Received On  01/09/19  -AW        User Key  (r) = Recorded By, (t) = Taken By, (c) = Cosigned By    Initials Name Provider Type    Solange Mcgraw, MS CCC-SLP Speech and Language Pathologist          Therapy Charges for Today     Code Description Service Date Service Provider Modifiers Qty    82304974649 HC ST EVAL ORAL PHARYNG SWALLOW 4 1/8/2019 Solange Lopez MS CCC-SLP GN 1    [id number] HC ST TREATMENT SWALLOW 3 1/9/2019 Solange Lopez MS CCC-SLP GN 1               Solange Lopez MS CCC-SLP  1/9/2019

## 2019-01-09 NOTE — PROGRESS NOTES
Orthopedic Spine Progress Note    Subjective     Post-Operative Day: 7 post-spine procedure ACDF C3-7  Systemic or Specific Complaints: feeling much better. tolerating soft diet.    Objective     Vital signs in last 24 hours:  Temp:  [97.4 °F (36.3 °C)-98.6 °F (37 °C)] 98 °F (36.7 °C)  Heart Rate:  [78-84] 82  Resp:  [16-18] 18  BP: (145-163)/(80-92) 163/91    General: alert, appears stated age and cooperative   Neurovascular: stable   Wound: Wound clean and dry no evidence of infection.   Range of Motion: limited   DVT Exam: No evidence of DVT seen on physical exam.     Data Review  CBC:  Results from last 7 days   Lab Units  01/08/19   0603   WBC 10*3/mm3  8.25   RBC 10*6/mm3  4.69   HEMOGLOBIN g/dL  14.3   HEMATOCRIT %  43.1   PLATELETS 10*3/mm3  196       Assessment/Plan     Status post-spine procedure: Complications: dysphagia     Pain Relief: complete resolution    Anticoagulation: may resume Eliquis  Ok by ortho to resume ACE inhibitor  Will wean off IV steroid and plan on d/c with medrol dose pack, possibly tomorrow    Activity: out of bed and ambulate    Weight Bearing: FWB     LOS: 2 days     GRETA Watts    Date: 1/9/2019

## 2019-01-09 NOTE — CONSULTS
Diabetes Education    Patient Name:  Kristian Burgos  YOB: 1950  MRN: [id number]  Admit Date:  1/7/2019    Met with patient and explained purpose of visit.  Pt visibly upset and verbalized being upset at not being told he would be going home on insulin and needing to monitor his glucose levels.  Pt told this was temporary and only while he was on steroids that cause hyperglycemia/insulin resistance.  Discussed his slightly elevated A1c value and recommended retesting with his PCP when he was clear of steroids.  Spouse not at bedside.  Diabetes educator to return in AM to educate pt and spouse.  Above discussed w/ staff RN and CCP.      Electronically signed by:  Mildred Santiago RN  01/09/19 3:38 PM

## 2019-01-09 NOTE — PLAN OF CARE
Problem: Patient Care Overview  Goal: Plan of Care Review  Outcome: Ongoing (interventions implemented as appropriate)   01/09/19 1417   Coping/Psychosocial   Plan of Care Reviewed With patient   Plan of Care Review   Progress no change   OTHER   Outcome Summary Swallow reeval completed. Swallow improved since yesterday. Order received to cancel VFSS. Recommend diet advancement to regular. ST to sign off at this time.

## 2019-01-10 VITALS
OXYGEN SATURATION: 97 % | SYSTOLIC BLOOD PRESSURE: 168 MMHG | BODY MASS INDEX: 27.9 KG/M2 | WEIGHT: 210.54 LBS | DIASTOLIC BLOOD PRESSURE: 88 MMHG | TEMPERATURE: 97.7 F | HEIGHT: 73 IN | RESPIRATION RATE: 18 BRPM | HEART RATE: 55 BPM

## 2019-01-10 PROBLEM — G89.18 POST-OPERATIVE PAIN: Status: RESOLVED | Noted: 2019-01-07 | Resolved: 2019-01-10

## 2019-01-10 PROBLEM — E86.0 DEHYDRATION: Status: RESOLVED | Noted: 2019-01-07 | Resolved: 2019-01-10

## 2019-01-10 LAB
GLUCOSE BLDC GLUCOMTR-MCNC: 132 MG/DL (ref 70–130)
GLUCOSE BLDC GLUCOMTR-MCNC: 148 MG/DL (ref 70–130)

## 2019-01-10 PROCEDURE — 25010000002 DEXAMETHASONE PER 1 MG: Performed by: PHYSICIAN ASSISTANT

## 2019-01-10 PROCEDURE — 82962 GLUCOSE BLOOD TEST: CPT

## 2019-01-10 RX ORDER — METHYLPREDNISOLONE 4 MG/1
4 TABLET ORAL
Status: DISCONTINUED | OUTPATIENT
Start: 2019-01-12 | End: 2019-01-10 | Stop reason: HOSPADM

## 2019-01-10 RX ORDER — METHYLPREDNISOLONE 4 MG/1
4 TABLET ORAL
Status: DISCONTINUED | OUTPATIENT
Start: 2019-01-14 | End: 2019-01-10 | Stop reason: HOSPADM

## 2019-01-10 RX ORDER — METHYLPREDNISOLONE 4 MG/1
8 TABLET ORAL
Status: DISCONTINUED | OUTPATIENT
Start: 2019-01-10 | End: 2019-01-10 | Stop reason: HOSPADM

## 2019-01-10 RX ORDER — METHYLPREDNISOLONE 4 MG/1
4 TABLET ORAL
Status: DISCONTINUED | OUTPATIENT
Start: 2019-01-15 | End: 2019-01-10 | Stop reason: HOSPADM

## 2019-01-10 RX ORDER — METHYLPREDNISOLONE 4 MG/1
TABLET ORAL
Status: ON HOLD
Start: 2019-01-12 | End: 2023-01-24

## 2019-01-10 RX ORDER — METHYLPREDNISOLONE 4 MG/1
TABLET ORAL
Status: ON HOLD
Start: 2019-01-11 | End: 2023-01-24

## 2019-01-10 RX ORDER — METHYLPREDNISOLONE 4 MG/1
4 TABLET ORAL
Status: DISCONTINUED | OUTPATIENT
Start: 2019-01-10 | End: 2019-01-10 | Stop reason: HOSPADM

## 2019-01-10 RX ORDER — METHYLPREDNISOLONE 4 MG/1
4 TABLET ORAL
Status: DISCONTINUED | OUTPATIENT
Start: 2019-01-11 | End: 2019-01-10 | Stop reason: HOSPADM

## 2019-01-10 RX ORDER — METHYLPREDNISOLONE 4 MG/1
TABLET ORAL
Status: ON HOLD
Start: 2019-01-13 | End: 2023-01-24

## 2019-01-10 RX ORDER — METHYLPREDNISOLONE 4 MG/1
8 TABLET ORAL
Status: DISCONTINUED | OUTPATIENT
Start: 2019-01-11 | End: 2019-01-10 | Stop reason: HOSPADM

## 2019-01-10 RX ORDER — METHYLPREDNISOLONE 4 MG/1
4 TABLET ORAL
Status: DISCONTINUED | OUTPATIENT
Start: 2019-01-13 | End: 2019-01-10 | Stop reason: HOSPADM

## 2019-01-10 RX ORDER — METHYLPREDNISOLONE 4 MG/1
TABLET ORAL
Status: ON HOLD
Start: 2019-01-10 | End: 2023-01-24

## 2019-01-10 RX ORDER — METHYLPREDNISOLONE 4 MG/1
TABLET ORAL
Status: ON HOLD
Start: 2019-01-15 | End: 2023-01-24

## 2019-01-10 RX ORDER — METHYLPREDNISOLONE 4 MG/1
TABLET ORAL
Start: 2019-01-14

## 2019-01-10 RX ADMIN — ASPIRIN 81 MG: 81 TABLET, DELAYED RELEASE ORAL at 08:17

## 2019-01-10 RX ADMIN — LISINOPRIL 20 MG: 20 TABLET ORAL at 08:17

## 2019-01-10 RX ADMIN — DEXAMETHASONE SODIUM PHOSPHATE 2 MG: 4 INJECTION, SOLUTION INTRAMUSCULAR; INTRAVENOUS at 05:20

## 2019-01-10 RX ADMIN — SODIUM CHLORIDE, PRESERVATIVE FREE 3 ML: 5 INJECTION INTRAVENOUS at 08:21

## 2019-01-10 RX ADMIN — AMLODIPINE BESYLATE 5 MG: 5 TABLET ORAL at 08:17

## 2019-01-10 RX ADMIN — ALLOPURINOL 300 MG: 300 TABLET ORAL at 08:18

## 2019-01-10 RX ADMIN — APIXABAN 5 MG: 5 TABLET, FILM COATED ORAL at 08:17

## 2019-01-10 NOTE — PLAN OF CARE
Problem: Patient Care Overview  Goal: Plan of Care Review  Outcome: Ongoing (interventions implemented as appropriate)      Problem: Patient Care Overview  Goal: Plan of Care Review  Outcome: Ongoing (interventions implemented as appropriate)   01/10/19 0444   Coping/Psychosocial   Plan of Care Reviewed With patient   Plan of Care Review   Progress improving   OTHER   Outcome Summary A&OX4, VSS, ambulating frequently, will monitor

## 2019-01-10 NOTE — PROGRESS NOTES
Orthopedic Spine Progress Note    Subjective     Post-Operative Day:8 post-spine procedure ACDF C3-7  Systemic or Specific Complaints: feeling much better. no headaches or arm pain. Tolerating solid foods and liquids with infrequent episodes of dysphagia.  Objective     Vital signs in last 24 hours:  Temp:  [97.5 °F (36.4 °C)-97.7 °F (36.5 °C)] 97.7 °F (36.5 °C)  Heart Rate:  [55-64] 55  Resp:  [18] 18  BP: (158-178)/(86-96) 164/95    General: alert, appears stated age and cooperative   Neurovascular: stable   Wound: Wound clean and dry no evidence of infection.   Range of Motion: limited   DVT Exam: No evidence of DVT seen on physical exam.     Data Review  CBC:  Results from last 7 days   Lab Units  01/08/19   0603   WBC 10*3/mm3  8.25   RBC 10*6/mm3  4.69   HEMOGLOBIN g/dL  14.3   HEMATOCRIT %  43.1   PLATELETS 10*3/mm3  196       Assessment/Plan     Status post-spine procedure: Complications: dysphagia     Pain Relief: complete resolution    Ok to d/c to home today from ortho standpoint. Will d/c decadron and start medrol dose pack which he may resume upon discharge    Activity: out of bed and ambulate    Weight Bearing: FWB     LOS: 3 days     GRETA Watts    Date: 1/10/2019

## 2019-01-10 NOTE — CONSULTS
"Diabetes Education  Assessment/Teaching    Patient Name:  Kristian Burgos  YOB: 1950  MRN: [id number]  Admit Date:  1/7/2019      Assessment Date:  1/10/2019    Most Recent Value   General Information    Height  185.4 cm (72.99\")   Height Method  Actual   Weight  95.5 kg (210 lb 8.6 oz)   Weight Method  Bed scale   Pregnancy Assessment   Diabetes History   What type of diabetes do you have?  Steroid induced   Length of Diabetes Diagnosis  Newly diagnosed <6 months   Have you had diabetes education/teaching in the past?  no   Education Preferences   Nutrition Information   Assessment Topics   Healthy Eating - Assessment  Competent   Being Active - Assessment  Competent   Taking Medication - Assessment  Needs education   Problem Solving - Assessment  Needs education   Reducing Risk - Assessment  Needs education   Healthy Coping - Assessment  Competent   DM Goals   Taking Medication - Goal  Today   Monitoring - Goal  Today            Most Recent Value   DM Education Needs   Meter  Meter provided   Meter Type  Accuchek   Frequency of Testing  4 times a day   Medication  Insulin, Actions, Side effects, Administration, Pen   Problem Solving  Hypoglycemia, Hyperglycemia, Sick days, Signs, Symptoms, Treatment   Reducing Risks  A1C testing   Healthy Coping  Appropriate   Discharge Plan  Home   Motivation  Strong, Engaged   Teaching Method  Explanation, Discussion, Demonstration, Handouts, Teach back   Patient Response  Demonstrates adequately, Verbalized understanding            Other Comments:  Patient and wife were instructed on how to use insulin pen and Accucheck Guide BGM.  Patient was able to demonstrate how to use both through teach back.  Explained how to use SSI and when to check blood sugars.  Patient was provided with handouts as a reference to both.  Signs and symptoms of hypoglycemia explained and treatment.  Both verbalized an understanding of all the above.  Thank you for this consult.  Please " reconsult if needed.        Electronically signed by:  Albina Allen RN  01/10/19 10:16 AM

## 2019-01-10 NOTE — DISCHARGE SUMMARY
[facility]    Date of Discharge:  1/10/2019    PCP: Jaylen Dye MD    Discharge Diagnosis:   Active Hospital Problems    Diagnosis Date Noted   • **Neck swelling [R22.1] 01/07/2019   • Essential hypertension [I10] 01/07/2019   • Atrial fibrillation (CMS/HCC) [I48.91] 01/07/2019   • Pre-diabetes [R73.03] 01/07/2019      Resolved Hospital Problems    Diagnosis Date Noted Date Resolved   • Post-operative pain [G89.18] 01/07/2019 01/10/2019   • Dehydration [E86.0] 01/07/2019 01/10/2019     Procedures Performed       Consults     Date and Time Order Name Status Description    1/7/2019 1827 Inpatient Orthopedic Surgery Consult Completed     1/7/2019 1551 [facility] (on-call MD unless specified) Completed     1/7/2019 1413 Ortho (on-call MD unless specified) Completed         Hospital Course  Please see history and physical for details. Patient is a 68 y.o. male initially admitted for complaints of neck pain swelling and dysphagia.  Ultimately these  Were postoperative complications for previous anterior cervical surgical approach.  Patient was patient on IV Decadron with significant clinical improvement.  He was initially blanketed with antibiotics but there is no source of infection and those were discontinued.  The above was likely complicated by use of aspirin as well as Eliquis which are prescribed per his cardiologist.  These were temporarily held though orthopedics was okay with resumption.  Since resuming these he has continued to show clinical improvement.  He has some reactive hyperglycemia though he is not diabetic.  This is all secondary to the steroids which are also causing him some emotional lability.  At disposition I will prescribe him 1 Humalog vs NovoLog pen for sliding scale use while on steroids for tight glycemic control to prevent any further postoperative complications.  Diabetic education has seen the patient and he understands how to use his  meter as well as proper use of the pen.  I have extensively answered all questions to patient as well as spouse at bedside and they're very happy with the care they're received while here and are amenable to disposition at home.  This patient is back to baseline and his neck swelling and bruising is significantly improved.  He had no signs of acute blood loss anemia either.  His vital signs are stable as well as afebrile and his oxygen saturations are 97% on room air.      Condition on Discharge: Improved.     Temp:  [97.5 °F (36.4 °C)-97.7 °F (36.5 °C)] 97.7 °F (36.5 °C)  Heart Rate:  [55-64] 55  Resp:  [18] 18  BP: (158-178)/(88-96) 168/88  Body mass index is 27.78 kg/m².    Physical Exam   Constitutional: He is oriented to person, place, and time. He appears well-developed and well-nourished.   HENT:   Head: Normocephalic.   Eyes: EOM are normal. Pupils are equal, round, and reactive to light.   Neck: Neck supple.   Surgical scar healing appropriately without surrounding cellulitis.  Much improved bruising and swelling to the anterior portion of his neck   Cardiovascular: Normal rate and regular rhythm.   Pulmonary/Chest: Effort normal and breath sounds normal. No respiratory distress.   Abdominal: Soft. Bowel sounds are normal. He exhibits no distension. There is no tenderness.   Musculoskeletal: He exhibits no edema.   Neurological: He is alert and oriented to person, place, and time. No cranial nerve deficit.        Discharge Medications      New Medications      Instructions Start Date   insulin lispro 100 UNIT/ML injection  Commonly known as:  humaLOG   0-7 Units, Subcutaneous, 4 Times Daily With Meals & Nightly      MethylPREDNISolone 4 MG tablet  Commonly known as:  MEDROL (DANNY)   Take as directed on package instructions.      MethylPREDNISolone 4 MG tablet  Commonly known as:  MEDROL (DANNY)   Take as directed on package instructions.      MethylPREDNISolone 4 MG tablet  Commonly known as:  MEDROL (DANNY)    Take as directed on package instructions.      MethylPREDNISolone 4 MG tablet  Commonly known as:  MEDROL (DANNY)   Take as directed on package instructions.      MethylPREDNISolone 4 MG tablet  Commonly known as:  MEDROL (DANNY)   Take as directed on package instructions.      MethylPREDNISolone 4 MG tablet  Commonly known as:  MEDROL (DANNY)   Take as directed on package instructions.      MethylPREDNISolone 4 MG tablet  Commonly known as:  MEDROL (DANNY)   Take as directed on package instructions.   Start Date:  1/12/2019     MethylPREDNISolone 4 MG tablet  Commonly known as:  MEDROL (DANNY)   Take as directed on package instructions.   Start Date:  1/13/2019     MethylPREDNISolone 4 MG tablet  Commonly known as:  MEDROL (DANNY)   Take as directed on package instructions.   Start Date:  1/14/2019     MethylPREDNISolone 4 MG tablet  Commonly known as:  MEDROL (DANNY)   Take as directed on package instructions.   Start Date:  1/14/2019     MethylPREDNISolone 4 MG tablet  Commonly known as:  MEDROL (DANNY)   Take as directed on package instructions.   Start Date:  1/15/2019        Continue These Medications      Instructions Start Date   acetaminophen 500 MG tablet  Commonly known as:  TYLENOL   1,000 mg, Oral, Every 6 Hours PRN      allopurinol 300 MG tablet  Commonly known as:  ZYLOPRIM   300 mg, Oral, Every Morning      amLODIPine 5 MG tablet  Commonly known as:  NORVASC   5 mg, Oral, Every Morning      aspirin 81 MG tablet   81 mg, Oral, Daily      ELIQUIS 5 MG tablet tablet  Generic drug:  apixaban   5 mg, Oral, 2 Times Daily      HYDROcodone-acetaminophen 5-325 MG per tablet  Commonly known as:  NORCO   1 tablet, Oral, Every 6 Hours PRN      lisinopril 20 MG tablet  Commonly known as:  PRINIVIL,ZESTRIL   20 mg, Oral, Every Morning      metoprolol succinate  MG 24 hr tablet  Commonly known as:  TOPROL-XL   50 mg, Oral, Nightly      VITAMIN B-12 IJ   1,000 mcg, Injection, Every 30 Days              Additional Instructions  for the Follow-ups that You Need to Schedule     Discharge Follow-up with PCP   As directed       Currently Documented PCP:    Jaylen Dye MD    PCP Phone Number:    [phone]     Follow Up Details:  pcp 2-3 weeks - ortho per their recs - patient to call his cardiologist for outpt f/up           Follow-up Information     Jaylen Dye MD .    Specialty:  Family Medicine  Why:  pcp 2-3 weeks - ortho per their recs - patient to call his cardiologist for outpt f/up  Contact information:  [address] [place] [id number] [phone]                 Test Results Pending at Discharge     Dakota Anne MD  01/10/19  1:32 PM    Discharge time spent greater than 30 minutes.

## 2019-01-11 ENCOUNTER — NURSE TRIAGE (OUTPATIENT)
Dept: CALL CENTER | Facility: HOSPITAL | Age: 69
End: 2019-01-11

## 2019-01-11 NOTE — TELEPHONE ENCOUNTER
He is urinating he says it takes him a long time to urinate. He says he feels real full it is a slow flow emptying.     Reason for Disposition  • Urination is difficult to start (i.e., hesitancy) or straining    Additional Information  • Negative: Shock suspected (e.g., cold/pale/clammy skin, too weak to stand, low BP, rapid pulse)  • Negative: Sounds like a life-threatening emergency to the triager  • Negative: Followed a genital area injury  • Negative: Followed a genital area injury (penis, scrotum)  • Negative: Vaginal discharge  • Negative: Pus (white, yellow) or bloody discharge from end of penis  • Negative: [1] Taking antibiotic for urinary tract infection (UTI) AND [2] female  • Negative: [1] Taking antibiotic for urinary tract infection (UTI) AND [2] male  • Negative: [1] Discomfort (pain, burning or stinging) when passing urine AND [2] pregnant  • Negative: [1] Discomfort (pain, burning or stinging) when passing urine AND [2] postpartum < 1 month  • Negative: [1] Discomfort (pain, burning or stinging) when passing urine AND [2] female  • Negative: [1] Discomfort (pain, burning or stinging) when passing urine AND [2] male  • Negative: Pain or itching in the vulvar area  • Negative: Pain in scrotum is main symptom  • Negative: Blood in the urine is main symptom  • Negative: Symptoms arising from use of a urinary catheter (Ortiz or Coude)  • Negative: [1] Unable to urinate (or only a few drops) > 4 hours AND     [2] bladder feels very full (e.g., palpable bladder or strong urge to urinate)  • Negative: [1] Decreased urination and [2] drinking very little AND [2] dehydration suspected (e.g., dark urine, no urine > 12 hours, very dry mouth, very lightheaded)  • Negative: Patient sounds very sick or weak to the triager  • Negative: Fever > 100.5 F (38.1 C)  • Negative: Side (flank) or lower back pain present  • Negative: [1] Can't control passage of urine (i.e., urinary incontinence) AND [2] new onset (< 2  "weeks) or worsening  • Negative: Urinating more frequently than usual (i.e., frequency)  • Negative: Bad or foul-smelling urine  • Negative: [1] Can't control passage of urine (i.e., urinary incontinence, wetting self) AND [2] present > 2 weeks    Answer Assessment - Initial Assessment Questions  1. SYMPTOM: \"What's the main symptom you're concerned about?\" (e.g., frequency, incontinence)      He is having issues with it taking a long time to urinate.  2. ONSET: \"When did the  ________  start?\"      This started this afternoon.   3. PAIN: \"Is there any pain?\" If so, ask: \"How bad is it?\" (Scale: 1-10; mild, moderate, severe)      He thinks it is painful when he needs to urinate.  4. CAUSE: \"What do you think is causing the symptoms?\"      He is unsure.  5. OTHER SYMPTOMS: \"Do you have any other symptoms?\" (e.g., fever, flank pain, blood in urine, pain with urination)      No fever   6. PREGNANCY: \"Is there any chance you are pregnant?\" \"When was your last menstrual period?\"      n    Protocols used: URINARY SYMPTOMS-ADULT-AH      "

## 2019-01-26 NOTE — DISCHARGE SUMMARY
Date of Admission: 1/2/2019  9:49 AM  Date of Discharge:  1/3/2019    Discharge Diagnosis: s/p Procedure(s):  C3 - C7 ANTERIOR CERVICAL DISCECTOMY AND FUSION,      Presenting Problem/History of Present Illness  Cervical spinal stenosis [M48.02]     Attending Physician: Bret Mathews DO    Consults:   Consults     No orders found from 12/4/2018 to 1/3/2019.          Procedures Performed  Procedure(s):  C3 - C7 ANTERIOR CERVICAL DISCECTOMY AND FUSION       Hospital Course  Patient is a 68 y.o. male presented with cervical stenosis which was unresponsive to conservative treatment.  The patient presented to the operating room for surgery.  DVT and antibiotic prophylaxis were given.  The patient was transferred to [facility].   Physical therapy was consulted to assist with ambulation and transfers.  Ortiz catheter was removed.    Condition on Discharge:  The patient's pain was adequately controlled and the patient was thought to be hemodynamically stable for discharge.    Discharge Disposition  Home or Self Care    Discharge Medications     Discharge Medications      Continue These Medications      Instructions Start Date   acetaminophen 500 MG tablet  Commonly known as:  TYLENOL   1,000 mg, Oral, Every 6 Hours PRN      allopurinol 300 MG tablet  Commonly known as:  ZYLOPRIM   300 mg, Oral, Every Morning      amLODIPine 5 MG tablet  Commonly known as:  NORVASC   5 mg, Oral, Every Morning      aspirin 81 MG tablet   81 mg, Oral, Daily      ELIQUIS 5 MG tablet tablet  Generic drug:  apixaban   5 mg, Oral, 2 Times Daily      lisinopril 20 MG tablet  Commonly known as:  PRINIVIL,ZESTRIL   20 mg, Oral, Every Morning      metoprolol succinate  MG 24 hr tablet  Commonly known as:  TOPROL-XL   50 mg, Oral, Nightly      VITAMIN B-12 IJ   1,000 mcg, Injection, Every 30 Days         Stop These Medications    Chlorhexidine Gluconate Cloth 2 % pads        ASK your doctor about these medications      Instructions Start Date    HYDROcodone-acetaminophen 5-325 MG per tablet  Commonly known as:  NORCO  Ask about: Should I take this medication?   1 tablet, Oral, Every 6 Hours PRN             Discharge Diet:   Diet Instructions     Diet:      Diet Texture / Consistency:  Regular          Activity at Discharge:   Activity Instructions     Other Restrictions (Specify)      Dr. Bret Mathews   [phone]     Post-op Instructions: Cervical Fusion       What are my limitations after surgery?  No lifting over 5 lbs, pushing, pulling, or twisting for the first 6 weeks. In general, do not lift anything heavier than a gallon of milk. It is ok to put on your socks and shoes.   Walking after surgery helps speed recovery and also helps to prevent post-operative blood clots.  You are encouraged to walk daily and increase distance as tolerated.  Activity will be gradually increased after 6 weeks. This will be discussed in detail at your follow up appointment.    You may find it more comfortable to sleep in a recliner or with your head elevated and arms propped on pillows to take the strain off of your neck.  If your surgery was done through the front of your neck, you may notice some difficulty swallowing or hoarseness after surgery.  This usually improves gradually over the course of weeks. Start with soft food and advance your diet as tolerated.  You may drive after 2 weeks. Do not drive while on narcotic pain medication.       Wound care  You may have a drain placed to help with swelling. Empty this as needed. You will need to come to the office tomorrow to have the drain removed.  You may remove your dressing after 48 hours. If you still have drainage after 48 hours, apply a clean dressing as needed. Keep the incision site clean and dry.  Your incision is closed with medical glue and internal sutures.  Unless you are told otherwise, there are no stitches to be removed.  Do not scrub the incision site or pick at the glue.  The glue helps to hold your  incision closed as your body heals. It also helps to prevent infection.  Showering is permitted after the first 48 hours as long as the wound is covered and kept dry. Do not get the incision site wet until it is completely scabbed over and no longer draining.   Do not apply any lotions or ointments to the wound until it is completely healed.  Do not submerge the incision site in a bathtub or pool until it is completely healed and free of scab.      When is my next office appointment?  You should have your first post-op visit in 2 weeks.   Your office visit will be at the [facility] located at [address] [place] [address], [address] [place] [address]                            Pain Control, Medication and Refills  You will be given prescriptions for pain medication at the time of discharge.   You may reduce pain and swelling by applying ice to your neck for 15 minutes at a time. You can do this several times a day for the first few days after surgery.   Call the office for prescription refills. PLEASE GIVE 48 HOURS NOTICE FOR ALL REFILL REQUESTS. ALSO NOTE WE DO NOT TAKE PRESCRIPTION CALLS ON WEEKENDS OR HOLIDAYS.  Narcotics and inactivity can cause constipation. Drink plenty of fluids and eat a high fiber diet. You may need to take an over-the-counter stool softener or laxative.  As the pain improves, you may try taking over the counter acetaminophen (Tylenol) for pain instead of narcotics. Most narcotics also contain Tylenol, so be careful if you are combining these drugs with other Tylenol containing products. Do not exceed the recommended daily dose of Tylenol.  Some studies have implied that anti-inflammatory medicine (NSAIDs) can delay bone healing.  While I recommend avoiding prescription anti-inflammatory medicines, it is probably OK to take over the counter NSAIDs. However, you may want to minimize their use if possible during the first three months after surgery. Examples of prescription NSAIDs  include meloxicam (Mobic), Celebrex, Diclofenac, Voltaren, Indocin and Toradol. OTC NSAIDs include ibuprofen (Motrin, Advil), naproxen (Aleve).        Who do I call if I have problems after surgery?  Please call our office at [phone] if you develop any of the following:     Fever (over 101.5°), chills, or sweats  New pain or weakness that began after you left the hospital  New bowel or bladder difficulty  Excessive swelling around your incision, excessive drainage or pus coming from the incision site  Difficulty breathing or swallowing.   New pain or swelling in the calves.    Office hours: Monday - Friday 8:00 a.m. - 4:30 p.m.    In case of an emergency after business hours, please contact me at the phone number given to you at the time of discharge or go to the nearest emergency room.          Follow-up Appointments  2 weeks

## 2022-12-06 ENCOUNTER — TELEPHONE (OUTPATIENT)
Dept: SURGERY | Facility: CLINIC | Age: 72
End: 2022-12-06

## 2023-01-09 ENCOUNTER — PREP FOR SURGERY (OUTPATIENT)
Dept: OTHER | Facility: HOSPITAL | Age: 73
End: 2023-01-09
Payer: MEDICARE

## 2023-01-09 DIAGNOSIS — Z12.11 COLON CANCER SCREENING: Primary | ICD-10-CM

## 2023-01-23 ENCOUNTER — ANESTHESIA EVENT (OUTPATIENT)
Dept: PERIOP | Facility: HOSPITAL | Age: 73
End: 2023-01-23
Payer: MEDICARE

## 2023-01-23 RX ORDER — FLECAINIDE ACETATE 50 MG/1
50 TABLET ORAL EVERY 12 HOURS
COMMUNITY

## 2023-01-23 RX ORDER — MELOXICAM 15 MG/1
15 TABLET ORAL DAILY
COMMUNITY

## 2023-01-24 ENCOUNTER — ANESTHESIA (OUTPATIENT)
Dept: PERIOP | Facility: HOSPITAL | Age: 73
End: 2023-01-24
Payer: MEDICARE

## 2023-01-24 ENCOUNTER — HOSPITAL ENCOUNTER (OUTPATIENT)
Facility: HOSPITAL | Age: 73
Setting detail: HOSPITAL OUTPATIENT SURGERY
Discharge: HOME | End: 2023-01-24
Attending: SURGERY | Admitting: SURGERY
Payer: MEDICARE

## 2023-01-24 VITALS
WEIGHT: 219 LBS | HEART RATE: 70 BPM | RESPIRATION RATE: 16 BRPM | TEMPERATURE: 97.5 F | OXYGEN SATURATION: 95 % | DIASTOLIC BLOOD PRESSURE: 80 MMHG | BODY MASS INDEX: 29.03 KG/M2 | SYSTOLIC BLOOD PRESSURE: 145 MMHG | HEIGHT: 73 IN

## 2023-01-24 DIAGNOSIS — Z12.11 COLON CANCER SCREENING: ICD-10-CM

## 2023-01-24 PROCEDURE — 45380 COLONOSCOPY AND BIOPSY: CPT | Performed by: SURGERY

## 2023-01-24 PROCEDURE — 25010000002 PROPOFOL 200 MG/20ML EMULSION: Performed by: NURSE ANESTHETIST, CERTIFIED REGISTERED

## 2023-01-24 PROCEDURE — 45385 COLONOSCOPY W/LESION REMOVAL: CPT | Performed by: SURGERY

## 2023-01-24 PROCEDURE — 88305 TISSUE EXAM BY PATHOLOGIST: CPT | Performed by: SURGERY

## 2023-01-24 RX ORDER — MAGNESIUM HYDROXIDE 1200 MG/15ML
LIQUID ORAL AS NEEDED
Status: DISCONTINUED | OUTPATIENT
Start: 2023-01-24 | End: 2023-01-24 | Stop reason: HOSPADM

## 2023-01-24 RX ORDER — LIDOCAINE HYDROCHLORIDE 10 MG/ML
0.5 INJECTION, SOLUTION EPIDURAL; INFILTRATION; INTRACAUDAL; PERINEURAL ONCE AS NEEDED
Status: DISCONTINUED | OUTPATIENT
Start: 2023-01-24 | End: 2023-01-24 | Stop reason: HOSPADM

## 2023-01-24 RX ORDER — DIPHENHYDRAMINE HYDROCHLORIDE 50 MG/ML
12.5 INJECTION INTRAMUSCULAR; INTRAVENOUS
Status: DISCONTINUED | OUTPATIENT
Start: 2023-01-24 | End: 2023-01-24 | Stop reason: HOSPADM

## 2023-01-24 RX ORDER — SODIUM CHLORIDE 0.9 % (FLUSH) 0.9 %
10 SYRINGE (ML) INJECTION EVERY 12 HOURS SCHEDULED
Status: DISCONTINUED | OUTPATIENT
Start: 2023-01-24 | End: 2023-01-24 | Stop reason: HOSPADM

## 2023-01-24 RX ORDER — SODIUM CHLORIDE 0.9 % (FLUSH) 0.9 %
10 SYRINGE (ML) INJECTION AS NEEDED
Status: DISCONTINUED | OUTPATIENT
Start: 2023-01-24 | End: 2023-01-24 | Stop reason: HOSPADM

## 2023-01-24 RX ORDER — ONDANSETRON 2 MG/ML
4 INJECTION INTRAMUSCULAR; INTRAVENOUS ONCE AS NEEDED
Status: DISCONTINUED | OUTPATIENT
Start: 2023-01-24 | End: 2023-01-24 | Stop reason: HOSPADM

## 2023-01-24 RX ORDER — PROPOFOL 10 MG/ML
INJECTION, EMULSION INTRAVENOUS AS NEEDED
Status: DISCONTINUED | OUTPATIENT
Start: 2023-01-24 | End: 2023-01-24 | Stop reason: SURG

## 2023-01-24 RX ORDER — SODIUM CHLORIDE, SODIUM LACTATE, POTASSIUM CHLORIDE, CALCIUM CHLORIDE 600; 310; 30; 20 MG/100ML; MG/100ML; MG/100ML; MG/100ML
100 INJECTION, SOLUTION INTRAVENOUS CONTINUOUS
Status: DISCONTINUED | OUTPATIENT
Start: 2023-01-24 | End: 2023-01-24 | Stop reason: HOSPADM

## 2023-01-24 RX ORDER — SODIUM CHLORIDE 9 MG/ML
40 INJECTION, SOLUTION INTRAVENOUS AS NEEDED
Status: DISCONTINUED | OUTPATIENT
Start: 2023-01-24 | End: 2023-01-24 | Stop reason: HOSPADM

## 2023-01-24 RX ORDER — SODIUM CHLORIDE, SODIUM LACTATE, POTASSIUM CHLORIDE, CALCIUM CHLORIDE 600; 310; 30; 20 MG/100ML; MG/100ML; MG/100ML; MG/100ML
9 INJECTION, SOLUTION INTRAVENOUS CONTINUOUS
Status: DISCONTINUED | OUTPATIENT
Start: 2023-01-24 | End: 2023-01-24 | Stop reason: HOSPADM

## 2023-01-24 RX ADMIN — PROPOFOL 100 MG: 10 INJECTION, EMULSION INTRAVENOUS at 09:07

## 2023-01-24 RX ADMIN — PROPOFOL 100 MG: 10 INJECTION, EMULSION INTRAVENOUS at 09:20

## 2023-01-24 RX ADMIN — SODIUM CHLORIDE, POTASSIUM CHLORIDE, SODIUM LACTATE AND CALCIUM CHLORIDE 9 ML/HR: 600; 310; 30; 20 INJECTION, SOLUTION INTRAVENOUS at 08:03

## 2023-01-24 RX ADMIN — PROPOFOL 50 MG: 10 INJECTION, EMULSION INTRAVENOUS at 09:15

## 2023-01-24 RX ADMIN — PROPOFOL 50 MG: 10 INJECTION, EMULSION INTRAVENOUS at 09:10

## 2023-01-24 NOTE — OP NOTE
Colonoscopy Procedure Note      Pre-operative Diagnosis: History of colon polyps    Post-operative Diagnosis: Diverticulosis and sigmoid colon polyps    Procedure: Colonoscopy with polypectomy hot snare and cold forceps    Surgeon: Melo    Anesthetic: MAC per Gagan Medellin CRNA    Estimated Blood Loss: Minimal    Complications: None    Indications: History of colon polyps    Findings/Treatments:   There were 2 sigmoid colon polyps-the larger 1 was with hot snare and the smaller was removed with cold forceps       Scope Withdrawal Time:  6 minutes      Recommendations: Await pathology      Procedure Details     After discussing the benefits and risks of the procedure with the patient, not limited to but including:  Bleeding, infection, perforation, aspiration; informed consent was signed.  The patient was taken into the endoscopy room at [facility] and placed in the left lateral decubitus position.  MAC anesthesia was given with appropriate cardiopulmonary monitoring.  A rectal exam was performed.  Sphincter tone was normal.  The colonoscope was then inserted and carefully advanced to the cecum while visualizing the mucosa.  The cecum was identified by the ileocecal valve and the orifice of the appendix.  Once in the cecum the scope was slowly withdrawn while carefully evaluating the mucosa and deflating air.  There was 1 large sigmoid colon polyp removed with hot snare as well as 1 smaller sigmoid colon polyp removed cold forceps.  Kristian also had moderate diverticulosis sigmoid colon.  Once in the rectum the scope was retroflexed and straightened and removed.  Kristian was then taken to the recovery area in stable postoperative condition having tolerated his procedure well.    Pham Alejo DO

## 2023-01-24 NOTE — H&P
General Surgery      Patient Care Team:  Jaylen Dye MD as PCP - General (Family Medicine)  Truong Dubose MD (Cardiology)    CHIEF COMPLAINT: History of colon polyps    HISTORY OF PRESENT ILLNESS:    This very pleasant 72-year-old male is here for a colonoscopy.  His last colonoscopy was 6 years ago.  He had some benign polyps removed.  He had an episode of dehydration his morning and went to the emergency department where he had fluids which made him feel much better.  He has no chest pain or shortness of breath.  He has no fevers or chills.  He has no family history of colon cancer.      Past Medical History:   Diagnosis Date   • Alteration in anticoagulation     on eloquis   • Arthritis    • Atrial fibrillation (HCC)    • Hypertension    • Migraines    • Neck pain      Past Surgical History:   Procedure Laterality Date   • ANTERIOR CERVICAL DISCECTOMY W/ FUSION N/A 2019    Procedure: C3 - C7 ANTERIOR CERVICAL DISCECTOMY AND FUSION;  Surgeon: Bret Mathews DO;  Location: [facility];  Service: Orthopedic Spine   • APPENDECTOMY     • CIRCUMCISION     • COLONOSCOPY     • ENDOSCOPY     • GUM SURGERY       Family History   Problem Relation Age of Onset   • Malig Hyperthermia Neg Hx      Social History     Tobacco Use   • Smoking status: Former     Packs/day: 1.00     Years: 10.00     Pack years: 10.00     Types: Cigarettes     Quit date:      Years since quittin.0   • Smokeless tobacco: Never   Vaping Use   • Vaping Use: Never used   Substance Use Topics   • Alcohol use: Yes     Alcohol/week: 2.0 standard drinks     Types: 2 Cans of beer per week   • Drug use: No     Medications Prior to Admission   Medication Sig Dispense Refill Last Dose   • acetaminophen (TYLENOL) 500 MG tablet Take 1,000 mg by mouth Every 6 (Six) Hours As Needed for Mild Pain .   2023   • allopurinol (ZYLOPRIM) 300 MG tablet Take 300 mg by mouth Every Morning.   2023   • amLODIPine (NORVASC) 5 MG tablet Take 5 mg  "by mouth Every Morning.   1/23/2023   • apixaban (ELIQUIS) 5 MG tablet tablet Take 5 mg by mouth 2 (Two) Times a Day.   Past Week   • aspirin 81 MG tablet Take 81 mg by mouth Daily.   Past Week   • flecainide (TAMBOCOR) 50 MG tablet Take 50 mg by mouth Every 12 (Twelve) Hours.   1/23/2023   • lisinopril (PRINIVIL,ZESTRIL) 20 MG tablet Take 20 mg by mouth Every Morning.   1/23/2023   • meloxicam (MOBIC) 15 MG tablet Take 15 mg by mouth Daily.   Past Week   • metoprolol succinate XL (TOPROL-XL) 100 MG 24 hr tablet Take 50 mg by mouth Every Night.   1/23/2023   • MethylPREDNISolone (MEDROL, DANNY,) 4 MG tablet Take as directed on package instructions.      • MethylPREDNISolone (MEDROL, DANNY,) 4 MG tablet Take as directed on package instructions.        Allergies:  Crestor [rosuvastatin]    REVIEW OF SYSTEMS:  Please see the above history of present illness for pertinent positives and negatives.  The remainder of the patient's systems have been reviewed and are negative.     Vital Signs  Heart Rate:  [74] 74  Resp:  [16] 16  BP: (140)/(79) 140/79    Flowsheet Rows    Flowsheet Row First Filed Value   Admission Height 185.4 cm (73\") Documented at 01/24/2023 0754   Admission Weight 99.3 kg (219 lb) Documented at 01/24/2023 0754           Physical Exam:  Physical Exam   Constitutional: Patient appears well-developed and well-nourished and in no acute distress   HEENT:   Head: Normocephalic and atraumatic.   Eyes:  Pupils are equal, round, and reactive to light.  Mouth and Throat: Patient has moist mucous membranes. Oropharynx is clear of any erythema or exudate.     Musculoskeletal: Normal posture.  Extremities: No edema.  No deformities noted  Neurological: Patient is alert and oriented.  Psychological:   Mood and behavior appropriate.  Skin: Skin is warm and dry.    Debilities/Disabilities Identified: None    Emotional Behavior: Appropriate           Results Review:    I reviewed the patient's new clinical results.  Lab " Results (most recent)     None          Imaging Results (Most Recent)     None            ECG/EMG Results (most recent)     None            Assessment & Plan     History of colon polyps  I discussed with the patient the benefits and risks of performing endoscopy.  Benefits and risks were not limited to but including bleeding, infection, perforation, complications of anesthesia, aspiration.  The patient appeared to understand and is willing to proceed.    Thank you for allowing me to participate in the care of this interesting patient.    Pham Alejo DO  01/24/23  07:59 EST

## 2023-01-24 NOTE — ANESTHESIA POSTPROCEDURE EVALUATION
Patient: Kristian Burgos    Procedure Summary     Date: 01/24/23 Room / Location: [facility] ENDOSCOPY 1 /  [facility] OR    Anesthesia Start: 0905 Anesthesia Stop: 0931    Procedure: COLONOSCOPY with polypectomy Diagnosis:       Colon cancer screening      (Colon cancer screening [Z12.11])    Surgeons: Pham Alejo DO Provider: Gagan Medellin CRNA    Anesthesia Type: MAC ASA Status: 2          Anesthesia Type: MAC    Vitals  Vitals Value Taken Time   /80 01/24/23 1005   Temp     Pulse 70 01/24/23 1005   Resp 16 01/24/23 1005   SpO2 95 % 01/24/23 1000           Post Anesthesia Care and Evaluation    Patient location during evaluation: PHASE II  Patient participation: complete - patient participated  Level of consciousness: awake and alert  Pain score: 2 (headache remains, no change last 2 days.)    Airway patency: patent  Anesthetic complications: No anesthetic complications  PONV Status: none  Cardiovascular status: acceptable  Respiratory status: acceptable  Hydration status: acceptable

## 2023-01-24 NOTE — ANESTHESIA PREPROCEDURE EVALUATION
Anesthesia Evaluation     Patient summary reviewed and Nursing notes reviewed   no history of anesthetic complications:  NPO Solid Status: > 8 hours  NPO Liquid Status: > 8 hours           Airway   Mallampati: II  TM distance: >3 FB  Neck ROM: full  No difficulty expected  Dental    (+) implants    Pulmonary - normal exam    breath sounds clear to auscultation  (-) not a smokerSleep apnea: has been tested, no machine, can't slleep on back.    ROS comment: Cough one month ago, resolved  Cardiovascular - normal exam  Exercise tolerance: good (4-7 METS)    PT is on anticoagulation therapy  Patient on routine beta blocker and Beta blocker given within 24 hours of surgery  Rhythm: regular  Rate: normal    (+) hypertension well controlled 2 medications or greater, dysrhythmias Paroxysmal Atrial Fib,       Neuro/Psych  (+) headaches (migraines), dizziness/light headedness (this am),    GI/Hepatic/Renal/Endo    (+)   renal disease stones,     Musculoskeletal     (+) neck pain (ACDF, neck stiff), neck stiffness,   Abdominal  - normal exam   Substance History - negative use     OB/GYN negative ob/gyn ROS         Other   arthritis (fingers, ankles),      ROS/Med Hx Other: eliquis last 3 days ago                  Anesthesia Plan    ASA 2     MAC     (Seen in ER [place] for syncope this am.  Per ER MD, work up negative.  Possible vasovagal per patient.)    Anesthetic plan, risks, benefits, and alternatives have been provided, discussed and informed consent has been obtained with: patient and spouse/significant other.        CODE STATUS:

## 2023-01-25 LAB
LAB AP CASE REPORT: NORMAL
PATH REPORT.FINAL DX SPEC: NORMAL
PATH REPORT.GROSS SPEC: NORMAL

## 2023-02-07 ENCOUNTER — TELEPHONE (OUTPATIENT)
Dept: SURGERY | Facility: CLINIC | Age: 73
End: 2023-02-07
Payer: MEDICARE

## 2023-02-07 NOTE — TELEPHONE ENCOUNTER
"Pt called and states since he had c-scope his BM schedule has changed.  He reports before c-scope he had a daily BM \"like clockwork.\"  Now he often skips several days.  Pt advised to restart 1 TBLS of Metamucil in 3 oz OJ every am.  Can increase to twice daily as needed. Pt also instructed to drink at least 10 glasses of water daily.  Pt to report back to this office in 2 weeks if no improvement.  Pt is agreeable.   "

## 2023-03-13 ENCOUNTER — TELEPHONE (OUTPATIENT)
Dept: SURGERY | Facility: CLINIC | Age: 73
End: 2023-03-13
Payer: MEDICARE

## 2023-03-13 NOTE — TELEPHONE ENCOUNTER
Rec'd phone call from pt and he reports the Metamucil has helped with constipation but his BMs are still not regular and he is having abd cramping.  Offered pt appt 03/15/2023.  Pt declined and req appt 03/24/2023 @ [facility]. Appt given.

## 2023-03-24 ENCOUNTER — OFFICE VISIT (OUTPATIENT)
Dept: SURGERY | Facility: CLINIC | Age: 73
End: 2023-03-24
Payer: MEDICARE

## 2023-03-24 DIAGNOSIS — K57.90 DIVERTICULOSIS: Primary | ICD-10-CM

## 2023-03-24 PROBLEM — Z12.11 COLON CANCER SCREENING: Status: RESOLVED | Noted: 2023-01-09 | Resolved: 2023-03-24

## 2023-03-24 PROCEDURE — 1160F RVW MEDS BY RX/DR IN RCRD: CPT | Performed by: SURGERY

## 2023-03-24 PROCEDURE — 1159F MED LIST DOCD IN RCRD: CPT | Performed by: SURGERY

## 2023-03-24 PROCEDURE — 99213 OFFICE O/P EST LOW 20 MIN: CPT | Performed by: SURGERY

## 2023-03-24 NOTE — PROGRESS NOTES
Kristian Burgos 72 y.o. male presents for FU.  Pt states he is feeling better than when he scheduled this appt to FU c-scope and diff having BM.  Pt states he feels much better since stopping Metamucil.      HPI   Above noted and agree.  Kristian is here following up from a colonoscopy with polypectomy.  He was having issues with abdominal cramping and right lower quadrant pain.  He stopped taking the metamucil and the pain improved.  He has issues with right lower quadrant pain.  In 1976 he had a perforated appendicitis and his pain occurs slightly below that incision.  We discussed his colonoscopy.  He understands he has diverticulosis and adenomatous colon polyps.  He says he does not drink a lot of water but has been doing better with it.        Review of Systems        Past Medical History:   Diagnosis Date   • Alteration in anticoagulation     on eloquis   • Arthritis    • Atrial fibrillation (HCC)    • Hypertension    • Migraines    • Neck pain            Past Surgical History:   Procedure Laterality Date   • ANTERIOR CERVICAL DISCECTOMY W/ FUSION N/A 1/2/2019    Procedure: C3 - C7 ANTERIOR CERVICAL DISCECTOMY AND FUSION;  Surgeon: Bret Mathews DO;  Location: [facility] OR;  Service: Orthopedic Spine   • APPENDECTOMY     • CIRCUMCISION     • COLONOSCOPY     • COLONOSCOPY W/ POLYPECTOMY N/A 1/24/2023    Procedure: COLONOSCOPY with polypectomy;  Surgeon: Pham Alejo DO;  Location: [facility] OR;  Service: Gastroenterology;  Laterality: N/A;  diverticulosis  sigmoid colon polyps x2 (hot snare x1)   • ENDOSCOPY     • GUM SURGERY             Physical Exam  Vitals and nursing note reviewed.   Constitutional:       Appearance: Normal appearance.   HENT:      Head: Normocephalic and atraumatic.   Cardiovascular:      Rate and Rhythm: Normal rate and regular rhythm.   Pulmonary:      Effort: Pulmonary effort is normal.      Breath sounds: Normal breath sounds.   Abdominal:      General: Bowel sounds are normal.       Palpations: Abdomen is soft.      Comments: No incisional hernia noted on valsalva   Musculoskeletal:         General: No swelling or tenderness.   Skin:     General: Skin is warm and dry.   Neurological:      General: No focal deficit present.      Mental Status: He is alert and oriented to person, place, and time.   Psychiatric:         Mood and Affect: Mood normal.         Behavior: Behavior normal.             There were no vitals taken for this visit.        Diagnoses and all orders for this visit:    1. Diverticulosis (Primary)    Kristian will need a 3 year repeat colonoscopy.  We discussed trying 1 teaspoon of Metamucil and increasing his water intake.  I offered to order a CT scan but he declined at this time.  He may return anytime as needed.    Thank you for allowing me to participate in the care of this interesting patient.    Answers for HPI/ROS submitted by the patient on 3/20/2023  What is the primary reason for your visit?: Abdominal Pain

## 2023-03-24 NOTE — PATIENT INSTRUCTIONS
Exercising to Lose Weight  Getting regular exercise is important for everyone. It is especially important if you are overweight. Being overweight increases your risk of heart disease, stroke, diabetes, high blood pressure, and several types of cancer. Exercising, and reducing the calories you consume, can help you lose weight and improve fitness and health.  Exercise can be moderate or vigorous intensity. To lose weight, most people need to do a certain amount of moderate or vigorous-intensity exercise each week.  How can exercise affect me?  You lose weight when you exercise enough to burn more calories than you eat. Exercise also reduces body fat and builds muscle. The more muscle you have, the more calories you burn. Exercise also:  • Improves mood.  • Reduces stress and tension.  • Improves your overall fitness, flexibility, and endurance.  • Increases bone strength.  Moderate-intensity exercise  Moderate-intensity exercise is any activity that gets you moving enough to burn at least three times more energy (calories) than if you were sitting.  Examples of moderate exercise include:  • Walking a mile in 15 minutes.  • Doing light yard work.  • Biking at an easy pace.  Most people should get at least 150 minutes of moderate-intensity exercise a week to maintain their body weight.  Vigorous-intensity exercise  Vigorous-intensity exercise is any activity that gets you moving enough to burn at least six times more calories than if you were sitting. When you exercise at this intensity, you should be working hard enough that you are not able to carry on a conversation.  Examples of vigorous exercise include:  • Running.  • Playing a team sport, such as football, basketball, and soccer.  • Jumping rope.  Most people should get at least 75 minutes a week of vigorous exercise to maintain their body weight.  What actions can I take to lose weight?  The amount of exercise you need to lose weight depends on:  • Your  age.  • The type of exercise.  • Any health conditions you have.  • Your overall physical ability.  Talk to your health care provider about how much exercise you need and what types of activities are safe for you.  Nutrition    • Make changes to your diet as told by your health care provider or diet and nutrition specialist (dietitian). This may include:  ? Eating fewer calories.  ? Eating more protein.  ? Eating less unhealthy fats.  ? Eating a diet that includes fresh fruits and vegetables, whole grains, low-fat dairy products, and lean protein.  ? Avoiding foods with added fat, salt, and sugar.  • Drink plenty of water while you exercise to prevent dehydration or heat stroke.  Activity  • Choose an activity that you enjoy and set realistic goals. Your health care provider can help you make an exercise plan that works for you.  • Exercise at a moderate or vigorous intensity most days of the week.  ? The intensity of exercise may vary from person to person. You can tell how intense a workout is for you by paying attention to your breathing and heartbeat. Most people will notice their breathing and heartbeat get faster with more intense exercise.  • Do resistance training twice each week, such as:  ? Push-ups.  ? Sit-ups.  ? Lifting weights.  ? Using resistance bands.  • Getting short amounts of exercise can be just as helpful as long, structured periods of exercise. If you have trouble finding time to exercise, try doing these things as part of your daily routine:  ? Get up, stretch, and walk around every 30 minutes throughout the day.  ? Go for a walk during your lunch break.  ? Park your car farther away from your destination.  ? If you take public transportation, get off one stop early and walk the rest of the way.  ? Make phone calls while standing up and walking around.  ? Take the stairs instead of elevators or escalators.  • Wear comfortable clothes and shoes with good support.  • Do not exercise so much  that you hurt yourself, feel dizzy, or get very short of breath.  Where to find more information  • [place] Department of Health and Human Services: www.hhs.gov  • Centers for Disease Control and Prevention: www.cdc.gov  Contact a health care provider:  • Before starting a new exercise program.  • If you have questions or concerns about your weight.  • If you have a medical problem that keeps you from exercising.  Get help right away if:  • You have any of the following while exercising:  ? Injury.  ? Dizziness.  ? Difficulty breathing or shortness of breath that does not go away when you stop exercising.  ? Chest pain.  ? Rapid heartbeat.  These symptoms may represent a serious problem that is an emergency. Do not wait to see if the symptoms will go away. Get medical help right away. Call your local emergency services (911 in the [place] Do not drive yourself to the hospital.  Summary  • Getting regular exercise is especially important if you are overweight.  • Being overweight increases your risk of heart disease, stroke, diabetes, high blood pressure, and several types of cancer.  • Losing weight happens when you burn more calories than you eat.  • Reducing the amount of calories you eat, and getting regular moderate or vigorous exercise each week, helps you lose weight.  This information is not intended to replace advice given to you by your health care provider. Make sure you discuss any questions you have with your health care provider.  Document Revised: 02/13/2022 Document Reviewed: 02/13/2022  [employer] Patient Education © 2022 [employer]      Calorie Counting for Weight Loss  Calories are units of energy. Your body needs a certain number of calories from food to keep going throughout the day. When you eat or drink more calories than your body needs, your body stores the extra calories mostly as fat. When you eat or drink fewer calories than your body needs, your body burns fat to get the energy it  needs.  Calorie counting means keeping track of how many calories you eat and drink each day. Calorie counting can be helpful if you need to lose weight. If you eat fewer calories than your body needs, you should lose weight. Ask your health care provider what a healthy weight is for you.  For calorie counting to work, you will need to eat the right number of calories each day to lose a healthy amount of weight per week. A dietitian can help you figure out how many calories you need in a day and will suggest ways to reach your calorie goal.  • A healthy amount of weight to lose each week is usually 1-2 lb (0.5-0.9 kg). This usually means that your daily calorie intake should be reduced by 500-750 calories.  • Eating 1,200-1,500 calories a day can help most women lose weight.  • Eating 1,500-1,800 calories a day can help most men lose weight.  What do I need to know about calorie counting?  Work with your health care provider or dietitian to determine how many calories you should get each day. To meet your daily calorie goal, you will need to:  • Find out how many calories are in each food that you would like to eat. Try to do this before you eat.  • Decide how much of the food you plan to eat.  • Keep a food log. Do this by writing down what you ate and how many calories it had.  To successfully lose weight, it is important to balance calorie counting with a healthy lifestyle that includes regular activity.  Where do I find calorie information?  The number of calories in a food can be found on a Nutrition Facts label. If a food does not have a Nutrition Facts label, try to look up the calories online or ask your dietitian for help.  Remember that calories are listed per serving. If you choose to have more than one serving of a food, you will have to multiply the calories per serving by the number of servings you plan to eat. For example, the label on a package of bread might say that a serving size is 1 slice and  that there are 90 calories in a serving. If you eat 1 slice, you will have eaten 90 calories. If you eat 2 slices, you will have eaten 180 calories.  How do I keep a food log?  After each time that you eat, record the following in your food log as soon as possible:  • What you ate. Be sure to include toppings, sauces, and other extras on the food.  • How much you ate. This can be measured in cups, ounces, or number of items.  • How many calories were in each food and drink.  • The total number of calories in the food you ate.  Keep your food log near you, such as in a pocket-sized notebook or on an triston or website on your mobile phone. Some programs will calculate calories for you and show you how many calories you have left to meet your daily goal.  What are some portion-control tips?  • Know how many calories are in a serving. This will help you know how many servings you can have of a certain food.  • Use a measuring cup to measure serving sizes. You could also try weighing out portions on a kitchen scale. With time, you will be able to estimate serving sizes for some foods.  • Take time to put servings of different foods on your favorite plates or in your favorite bowls and cups so you know what a serving looks like.  • Try not to eat straight from a food's packaging, such as from a bag or box. Eating straight from the package makes it hard to see how much you are eating and can lead to overeating. Put the amount you would like to eat in a cup or on a plate to make sure you are eating the right portion.  • Use smaller plates, glasses, and bowls for smaller portions and to prevent overeating.  • Try not to multitask. For example, avoid watching TV or using your computer while eating. If it is time to eat, sit down at a table and enjoy your food. This will help you recognize when you are full. It will also help you be more mindful of what and how much you are eating.  What are tips for following this plan?  Reading  food labels  • Check the calorie count compared with the serving size. The serving size may be smaller than what you are used to eating.  • Check the source of the calories. Try to choose foods that are high in protein, fiber, and vitamins, and low in saturated fat, trans fat, and sodium.  Shopping  • Read nutrition labels while you shop. This will help you make healthy decisions about which foods to buy.  • Pay attention to nutrition labels for low-fat or fat-free foods. These foods sometimes have the same number of calories or more calories than the full-fat versions. They also often have added sugar, starch, or salt to make up for flavor that was removed with the fat.  • Make a grocery list of lower-calorie foods and stick to it.  Cooking  • Try to cook your favorite foods in a healthier way. For example, try baking instead of frying.  • Use low-fat dairy products.  Meal planning  • Use more fruits and vegetables. One-half of your plate should be fruits and vegetables.  • Include lean proteins, such as chicken, turkey, and fish.  Lifestyle  Each week, aim to do one of the following:  • 150 minutes of moderate exercise, such as walking.  • 75 minutes of vigorous exercise, such as running.  General information  • Know how many calories are in the foods you eat most often. This will help you calculate calorie counts faster.  • Find a way of tracking calories that works for you. Get creative. Try different apps or programs if writing down calories does not work for you.  What foods should I eat?    • Eat nutritious foods. It is better to have a nutritious, high-calorie food, such as an avocado, than a food with few nutrients, such as a bag of potato chips.  • Use your calories on foods and drinks that will fill you up and will not leave you hungry soon after eating.  ? Examples of foods that fill you up are nuts and nut butters, vegetables, lean proteins, and high-fiber foods such as whole grains. High-fiber foods  are foods with more than 5 g of fiber per serving.  • Pay attention to calories in drinks. Low-calorie drinks include water and unsweetened drinks.  The items listed above may not be a complete list of foods and beverages you can eat. Contact a dietitian for more information.  What foods should I limit?  Limit foods or drinks that are not good sources of vitamins, minerals, or protein or that are high in unhealthy fats. These include:  • Candy.  • Other sweets.  • Sodas, specialty coffee drinks, alcohol, and juice.  The items listed above may not be a complete list of foods and beverages you should avoid. Contact a dietitian for more information.  How do I count calories when eating out?  • Pay attention to portions. Often, portions are much larger when eating out. Try these tips to keep portions smaller:  ? Consider sharing a meal instead of getting your own.  ? If you get your own meal, eat only half of it. Before you start eating, ask for a container and put half of your meal into it.  ? When available, consider ordering smaller portions from the menu instead of full portions.  • Pay attention to your food and drink choices. Knowing the way food is cooked and what is included with the meal can help you eat fewer calories.  ? If calories are listed on the menu, choose the lower-calorie options.  ? Choose dishes that include vegetables, fruits, whole grains, low-fat dairy products, and lean proteins.  ? Choose items that are boiled, broiled, grilled, or steamed. Avoid items that are buttered, battered, fried, or served with cream sauce. Items labeled as crispy are usually fried, unless stated otherwise.  ? Choose water, low-fat milk, unsweetened iced tea, or other drinks without added sugar. If you want an alcoholic beverage, choose a lower-calorie option, such as a glass of wine or light beer.  ? Ask for dressings, sauces, and syrups on the side. These are usually high in calories, so you should limit the amount  you eat.  ? If you want a salad, choose a garden salad and ask for grilled meats. Avoid extra toppings such as richardson, cheese, or fried items. Ask for the dressing on the side, or ask for olive oil and vinegar or lemon to use as dressing.  • Estimate how many servings of a food you are given. Knowing serving sizes will help you be aware of how much food you are eating at restaurants.  Where to find more information  • Centers for Disease Control and Prevention: www.cdc.gov  • [place] Department of Agriculture: [employer]  Summary  • Calorie counting means keeping track of how many calories you eat and drink each day. If you eat fewer calories than your body needs, you should lose weight.  • A healthy amount of weight to lose per week is usually 1-2 lb (0.5-0.9 kg). This usually means reducing your daily calorie intake by 500-750 calories.  • The number of calories in a food can be found on a Nutrition Facts label. If a food does not have a Nutrition Facts label, try to look up the calories online or ask your dietitian for help.  • Use smaller plates, glasses, and bowls for smaller portions and to prevent overeating.  • Use your calories on foods and drinks that will fill you up and not leave you hungry shortly after a meal.  This information is not intended to replace advice given to you by your health care provider. Make sure you discuss any questions you have with your health care provider.  Document Revised: 01/28/2021 Document Reviewed: 01/28/2021  [employer] Patient Education © 2022 [employer]

## (undated) DEVICE — VIAL FORMALIN CAP 10P 40ML

## (undated) DEVICE — JACKSON-PRATT 100CC BULB RESERVOIR: Brand: CARDINAL HEALTH

## (undated) DEVICE — SAFELINER SUCTION CANISTER 1000CC: Brand: DEROYAL

## (undated) DEVICE — ADAPT CLN BIOGUARD AIR/H2O DISP

## (undated) DEVICE — YANKAUER: Brand: DEROYAL

## (undated) DEVICE — KT ORCA ORCAPOD DISP STRL

## (undated) DEVICE — GLV SURG SENSICARE PI LF PF 8 GRN STRL

## (undated) DEVICE — DRP MICROSCOPE 4 BINOCULAR CV 54X150IN

## (undated) DEVICE — Device

## (undated) DEVICE — SUT SILK 2/0 TIES 18IN A185H

## (undated) DEVICE — ANTIBACTERIAL UNDYED BRAIDED (POLYGLACTIN 910), SYNTHETIC ABSORBABLE SUTURE: Brand: COATED VICRYL

## (undated) DEVICE — ELECTRD BLD EDGE/INSUL1P 2.4X5.1MM STRL

## (undated) DEVICE — DRSNG TELFA PAD NONADH STR 1S 3X4IN

## (undated) DEVICE — SUT SILK 2/0 FS BLK 18IN 685G

## (undated) DEVICE — ADHS SKIN DERMABOND TOP ADVANCED

## (undated) DEVICE — LIMB HOLDER, WRIST/ANKLE: Brand: DEROYAL

## (undated) DEVICE — COLR CERV FM 3IN LG

## (undated) DEVICE — 3.0MM NEURO (MATCH HEAD) LESS AGGRESSIVE

## (undated) DEVICE — CODMAN® SURGICAL PATTIES 3/4" X 3/4" (1.91CM X 1.91CM): Brand: CODMAN®

## (undated) DEVICE — THE SINGLE USE ETRAP – POLYP TRAP IS USED FOR SUCTION RETRIEVAL OF ENDOSCOPICALLY REMOVED POLYPS.: Brand: ETRAP

## (undated) DEVICE — SNAR POLYP SENSATION STDOVL 27 240 BX40

## (undated) DEVICE — MAGNETIC DRAPE: Brand: DEVON

## (undated) DEVICE — DRSNG SURESITE WNDW 4X4.5

## (undated) DEVICE — CONN TBG Y 5 IN 1 LF STRL

## (undated) DEVICE — APPL DURAPREP IODOPHOR APL 26ML

## (undated) DEVICE — TEMPORARY FIXATION PIN: Brand: TRESTLE LUXE

## (undated) DEVICE — SMOKE EVACUATION TUBING WITH 7/8 IN TO 1/4 IN REDUCER: Brand: BUFFALO FILTER

## (undated) DEVICE — GLV SURG SENSICARE MICRO PF LF 8 STRL

## (undated) DEVICE — ADHESIVE ISLAND DRESSING: Brand: TELFA

## (undated) DEVICE — SPNG DISECTOR KTNER XRAY COTN 1/4X9/16IN PK/5

## (undated) DEVICE — FRCP BX RADJAW4 NDL 2.8 240CM LG OG BX40

## (undated) DEVICE — DRN WND JP RND W TROC SIL 10F 1/8IN

## (undated) DEVICE — SYR LL 3CC

## (undated) DEVICE — PK NEURO SPINE 40

## (undated) DEVICE — BW-412T DISP COMBO CLEANING BRUSH: Brand: SINGLE USE COMBINATION CLEANING BRUSH

## (undated) DEVICE — 1010 S-DRAPE TOWEL DRAPE 10/BX: Brand: STERI-DRAPE™

## (undated) DEVICE — SOL IRR H2O BTL 1000ML STRL

## (undated) DEVICE — FIXED 2.3MM DRILL BIT, 12MM: Brand: TRESTLE LUXE

## (undated) DEVICE — 3M(TM) TEGADERM(TM) TRANSPARENT FILM DRESSING FRAME STYLE 1627: Brand: 3M™ TEGADERM™

## (undated) DEVICE — DISPOSABLE BIPOLAR CABLE 12FT. (3.6M): Brand: KIRWAN

## (undated) DEVICE — PAD GRND E/S W/CORD SPLT A/